# Patient Record
Sex: MALE | Race: WHITE
[De-identification: names, ages, dates, MRNs, and addresses within clinical notes are randomized per-mention and may not be internally consistent; named-entity substitution may affect disease eponyms.]

---

## 2018-06-25 ENCOUNTER — HOSPITAL ENCOUNTER (INPATIENT)
Dept: HOSPITAL 72 - EMR | Age: 83
LOS: 7 days | Discharge: NURSING, REHAB OR LONG-TERM CARE | DRG: 871 | End: 2018-07-02
Payer: MEDICARE

## 2018-06-25 VITALS — SYSTOLIC BLOOD PRESSURE: 121 MMHG | DIASTOLIC BLOOD PRESSURE: 67 MMHG

## 2018-06-25 VITALS — DIASTOLIC BLOOD PRESSURE: 64 MMHG | SYSTOLIC BLOOD PRESSURE: 104 MMHG

## 2018-06-25 VITALS — WEIGHT: 173.01 LBS | HEIGHT: 72 IN | BODY MASS INDEX: 23.43 KG/M2

## 2018-06-25 VITALS — DIASTOLIC BLOOD PRESSURE: 62 MMHG | SYSTOLIC BLOOD PRESSURE: 127 MMHG

## 2018-06-25 VITALS — DIASTOLIC BLOOD PRESSURE: 67 MMHG | SYSTOLIC BLOOD PRESSURE: 127 MMHG

## 2018-06-25 VITALS — SYSTOLIC BLOOD PRESSURE: 116 MMHG | DIASTOLIC BLOOD PRESSURE: 75 MMHG

## 2018-06-25 VITALS — SYSTOLIC BLOOD PRESSURE: 113 MMHG | DIASTOLIC BLOOD PRESSURE: 66 MMHG

## 2018-06-25 VITALS — SYSTOLIC BLOOD PRESSURE: 111 MMHG | DIASTOLIC BLOOD PRESSURE: 60 MMHG

## 2018-06-25 VITALS — DIASTOLIC BLOOD PRESSURE: 59 MMHG | SYSTOLIC BLOOD PRESSURE: 105 MMHG

## 2018-06-25 DIAGNOSIS — N18.9: ICD-10-CM

## 2018-06-25 DIAGNOSIS — N40.1: ICD-10-CM

## 2018-06-25 DIAGNOSIS — E44.1: ICD-10-CM

## 2018-06-25 DIAGNOSIS — I25.10: ICD-10-CM

## 2018-06-25 DIAGNOSIS — A41.9: Primary | ICD-10-CM

## 2018-06-25 DIAGNOSIS — Z66: ICD-10-CM

## 2018-06-25 DIAGNOSIS — Z95.810: ICD-10-CM

## 2018-06-25 DIAGNOSIS — N17.9: ICD-10-CM

## 2018-06-25 DIAGNOSIS — I13.0: ICD-10-CM

## 2018-06-25 DIAGNOSIS — E87.6: ICD-10-CM

## 2018-06-25 DIAGNOSIS — R13.10: ICD-10-CM

## 2018-06-25 DIAGNOSIS — E87.0: ICD-10-CM

## 2018-06-25 DIAGNOSIS — I42.9: ICD-10-CM

## 2018-06-25 DIAGNOSIS — E11.22: ICD-10-CM

## 2018-06-25 DIAGNOSIS — I25.9: ICD-10-CM

## 2018-06-25 DIAGNOSIS — J44.1: ICD-10-CM

## 2018-06-25 DIAGNOSIS — E86.0: ICD-10-CM

## 2018-06-25 DIAGNOSIS — N31.9: ICD-10-CM

## 2018-06-25 DIAGNOSIS — N39.0: ICD-10-CM

## 2018-06-25 DIAGNOSIS — J96.00: ICD-10-CM

## 2018-06-25 DIAGNOSIS — I48.0: ICD-10-CM

## 2018-06-25 DIAGNOSIS — I50.43: ICD-10-CM

## 2018-06-25 DIAGNOSIS — E87.3: ICD-10-CM

## 2018-06-25 DIAGNOSIS — R33.8: ICD-10-CM

## 2018-06-25 LAB
ADD MANUAL DIFF: NO
ALBUMIN SERPL-MCNC: 3 G/DL (ref 3.4–5)
ALBUMIN/GLOB SERPL: 0.7 {RATIO} (ref 1–2.7)
ALP SERPL-CCNC: 84 U/L (ref 46–116)
ALT SERPL-CCNC: < 6 U/L (ref 12–78)
ANION GAP SERPL CALC-SCNC: 4 MMOL/L (ref 5–15)
APPEARANCE UR: (no result)
APTT BLD: 28 SEC (ref 23–33)
APTT PPP: (no result) S
AST SERPL-CCNC: 13 U/L (ref 15–37)
BASOPHILS NFR BLD AUTO: 0.4 % (ref 0–2)
BILIRUB SERPL-MCNC: 0.4 MG/DL (ref 0.2–1)
BUN SERPL-MCNC: 51 MG/DL (ref 7–18)
CALCIUM SERPL-MCNC: 9.3 MG/DL (ref 8.5–10.1)
CHLORIDE SERPL-SCNC: 107 MMOL/L (ref 98–107)
CK MB SERPL-MCNC: 0.8 NG/ML (ref 0–3.6)
CK SERPL-CCNC: 36 U/L (ref 26–308)
CO2 SERPL-SCNC: 35 MMOL/L (ref 21–32)
CREAT SERPL-MCNC: 2.2 MG/DL (ref 0.55–1.3)
EOSINOPHIL NFR BLD AUTO: 1.4 % (ref 0–3)
ERYTHROCYTE [DISTWIDTH] IN BLOOD BY AUTOMATED COUNT: 14.9 % (ref 11.6–14.8)
GLOBULIN SER-MCNC: 4.6 G/DL
GLUCOSE UR STRIP-MCNC: NEGATIVE MG/DL
HCT VFR BLD CALC: 31.4 % (ref 42–52)
HGB BLD-MCNC: 9.9 G/DL (ref 14.2–18)
INR PPP: 1.1 (ref 0.9–1.1)
KETONES UR QL STRIP: NEGATIVE
LEUKOCYTE ESTERASE UR QL STRIP: (no result)
LYMPHOCYTES NFR BLD AUTO: 29.8 % (ref 20–45)
MCV RBC AUTO: 102 FL (ref 80–99)
MONOCYTES NFR BLD AUTO: 9.3 % (ref 1–10)
NEUTROPHILS NFR BLD AUTO: 59.1 % (ref 45–75)
NITRITE UR QL STRIP: NEGATIVE
PH UR STRIP: 5 [PH] (ref 4.5–8)
PLATELET # BLD: 203 K/UL (ref 150–450)
POTASSIUM SERPL-SCNC: 4.2 MMOL/L (ref 3.5–5.1)
PROT UR QL STRIP: (no result)
RBC # BLD AUTO: 3.08 M/UL (ref 4.7–6.1)
SODIUM SERPL-SCNC: 146 MMOL/L (ref 136–145)
SP GR UR STRIP: 1.01 (ref 1–1.03)
UROBILINOGEN UR-MCNC: NORMAL MG/DL (ref 0–1)
WBC # BLD AUTO: 5.1 K/UL (ref 4.8–10.8)

## 2018-06-25 PROCEDURE — 80053 COMPREHEN METABOLIC PANEL: CPT

## 2018-06-25 PROCEDURE — 83605 ASSAY OF LACTIC ACID: CPT

## 2018-06-25 PROCEDURE — 85730 THROMBOPLASTIN TIME PARTIAL: CPT

## 2018-06-25 PROCEDURE — 80162 ASSAY OF DIGOXIN TOTAL: CPT

## 2018-06-25 PROCEDURE — 81003 URINALYSIS AUTO W/O SCOPE: CPT

## 2018-06-25 PROCEDURE — 83735 ASSAY OF MAGNESIUM: CPT

## 2018-06-25 PROCEDURE — 84484 ASSAY OF TROPONIN QUANT: CPT

## 2018-06-25 PROCEDURE — 87040 BLOOD CULTURE FOR BACTERIA: CPT

## 2018-06-25 PROCEDURE — 93005 ELECTROCARDIOGRAM TRACING: CPT

## 2018-06-25 PROCEDURE — 94660 CPAP INITIATION&MGMT: CPT

## 2018-06-25 PROCEDURE — 80061 LIPID PANEL: CPT

## 2018-06-25 PROCEDURE — 87181 SC STD AGAR DILUTION PER AGT: CPT

## 2018-06-25 PROCEDURE — 94664 DEMO&/EVAL PT USE INHALER: CPT

## 2018-06-25 PROCEDURE — 94760 N-INVAS EAR/PLS OXIMETRY 1: CPT

## 2018-06-25 PROCEDURE — 82553 CREATINE MB FRACTION: CPT

## 2018-06-25 PROCEDURE — 85025 COMPLETE CBC W/AUTO DIFF WBC: CPT

## 2018-06-25 PROCEDURE — 85610 PROTHROMBIN TIME: CPT

## 2018-06-25 PROCEDURE — 94640 AIRWAY INHALATION TREATMENT: CPT

## 2018-06-25 PROCEDURE — 93306 TTE W/DOPPLER COMPLETE: CPT

## 2018-06-25 PROCEDURE — 71045 X-RAY EXAM CHEST 1 VIEW: CPT

## 2018-06-25 PROCEDURE — 83880 ASSAY OF NATRIURETIC PEPTIDE: CPT

## 2018-06-25 PROCEDURE — 82803 BLOOD GASES ANY COMBINATION: CPT

## 2018-06-25 PROCEDURE — 82962 GLUCOSE BLOOD TEST: CPT

## 2018-06-25 PROCEDURE — 36600 WITHDRAWAL OF ARTERIAL BLOOD: CPT

## 2018-06-25 PROCEDURE — 36415 COLL VENOUS BLD VENIPUNCTURE: CPT

## 2018-06-25 PROCEDURE — 87086 URINE CULTURE/COLONY COUNT: CPT

## 2018-06-25 PROCEDURE — 99291 CRITICAL CARE FIRST HOUR: CPT

## 2018-06-25 PROCEDURE — 82550 ASSAY OF CK (CPK): CPT

## 2018-06-25 PROCEDURE — 87081 CULTURE SCREEN ONLY: CPT

## 2018-06-25 RX ADMIN — IPRATROPIUM BROMIDE AND ALBUTEROL SULFATE SCH ML: .5; 3 SOLUTION RESPIRATORY (INHALATION) at 11:19

## 2018-06-25 RX ADMIN — IPRATROPIUM BROMIDE AND ALBUTEROL SULFATE SCH ML: .5; 3 SOLUTION RESPIRATORY (INHALATION) at 19:23

## 2018-06-25 RX ADMIN — DIGOXIN SCH MG: 0.12 TABLET ORAL at 09:00

## 2018-06-25 RX ADMIN — VITAMIN D, TAB 1000IU (100/BT) SCH INTLU: 25 TAB at 09:00

## 2018-06-25 RX ADMIN — DEXTROSE MONOHYDRATE SCH MLS/HR: 50 INJECTION, SOLUTION INTRAVENOUS at 14:02

## 2018-06-25 RX ADMIN — INSULIN ASPART SCH UNITS: 100 INJECTION, SOLUTION INTRAVENOUS; SUBCUTANEOUS at 12:46

## 2018-06-25 RX ADMIN — DEXTROSE MONOHYDRATE SCH MLS/HR: 50 INJECTION, SOLUTION INTRAVENOUS at 22:09

## 2018-06-25 RX ADMIN — DORZOLAMIDE HYDROCHLORIDE SCH DROP: 20 SOLUTION/ DROPS OPHTHALMIC at 13:00

## 2018-06-25 RX ADMIN — DOCUSATE SODIUM SCH MG: 100 CAPSULE, LIQUID FILLED ORAL at 09:00

## 2018-06-25 RX ADMIN — Medication SCH MG: at 09:00

## 2018-06-25 RX ADMIN — CARBIDOPA AND LEVODOPA SCH TAB: 25; 100 TABLET ORAL at 17:00

## 2018-06-25 RX ADMIN — LATANOPROST SCH DROP: 50 SOLUTION OPHTHALMIC at 20:49

## 2018-06-25 RX ADMIN — CARBIDOPA AND LEVODOPA SCH TAB: 25; 100 TABLET ORAL at 09:00

## 2018-06-25 RX ADMIN — IPRATROPIUM BROMIDE AND ALBUTEROL SULFATE SCH ML: .5; 3 SOLUTION RESPIRATORY (INHALATION) at 14:58

## 2018-06-25 RX ADMIN — INSULIN DETEMIR SCH UNITS: 100 INJECTION, SOLUTION SUBCUTANEOUS at 09:19

## 2018-06-25 RX ADMIN — INSULIN DETEMIR SCH UNITS: 100 INJECTION, SOLUTION SUBCUTANEOUS at 18:00

## 2018-06-25 RX ADMIN — INSULIN ASPART SCH UNITS: 100 INJECTION, SOLUTION INTRAVENOUS; SUBCUTANEOUS at 21:00

## 2018-06-25 RX ADMIN — IPRATROPIUM BROMIDE AND ALBUTEROL SULFATE SCH ML: .5; 3 SOLUTION RESPIRATORY (INHALATION) at 22:52

## 2018-06-25 RX ADMIN — DORZOLAMIDE HYDROCHLORIDE SCH DROP: 20 SOLUTION/ DROPS OPHTHALMIC at 18:43

## 2018-06-25 RX ADMIN — TAMSULOSIN HYDROCHLORIDE SCH MG: 0.4 CAPSULE ORAL at 21:00

## 2018-06-25 RX ADMIN — Medication SCH TAB: at 21:00

## 2018-06-25 RX ADMIN — CARBIDOPA AND LEVODOPA SCH TAB: 25; 100 TABLET ORAL at 13:00

## 2018-06-25 RX ADMIN — DOCUSATE SODIUM SCH MG: 100 CAPSULE, LIQUID FILLED ORAL at 17:18

## 2018-06-25 RX ADMIN — CARBIDOPA AND LEVODOPA SCH TAB: 25; 100 TABLET ORAL at 21:00

## 2018-06-25 RX ADMIN — MEMANTINE HYDROCHLORIDE SCH MG: 10 TABLET ORAL at 09:00

## 2018-06-25 RX ADMIN — MEMANTINE HYDROCHLORIDE SCH MG: 10 TABLET ORAL at 17:18

## 2018-06-25 RX ADMIN — MAGNESIUM HYDROXIDE SCH ML: 400 SUSPENSION ORAL at 09:00

## 2018-06-25 RX ADMIN — INSULIN ASPART SCH UNITS: 100 INJECTION, SOLUTION INTRAVENOUS; SUBCUTANEOUS at 18:46

## 2018-06-25 NOTE — CONSULTATION
DATE OF CONSULTATION:  06/25/2018



INFECTIOUS DISEASES CONSULTATION



CONSULTING PHYSICIAN:  Shima Sanon M.D.



REFERRING PHYSICIAN:  Francisco Gomes M.D.



REASON FOR CONSULTATION:  Urinary tract infection.



HISTORY OF PRESENTING ILLNESS:  This is an 87-year-old gentleman with

history of diabetes, hypertension, chronic obstructive pulmonary disease,

congestive heart failure, and atrial fibrillation, who comes in from a

skilled nursing facility as he was unable to be aroused and was poorly

responsive.  He had a chest x-ray, which showed congestive heart failure.

He was also found to have urinary tract infection and an Infectious

Diseases consultation has been obtained for antibiotics.



PAST MEDICAL HISTORY:

1. History of diabetes.

2. Hypertension.

3. Chronic obstructive pulmonary disease.

4. Congestive heart failure.

5. Atrial fibrillation.



MEDICATIONS:  As an inpatient, he is on Xalatan drops, Senokot, vitamin B

complex, Coumadin, Zosyn, insulin, ipratropium and albuterol, Trusopt,

enoxaparin, calcium carbonate, carbidopa, levodopa, clonidine, digoxin,

docusate, finasteride, folic acid, milk of magnesia, Namenda,

multivitamin, ascorbic acid, vitamin D, ferrous sulfate, insulin,

Protonix, Lasix, warfarin, Tylenol, and Dulcolax.



ALLERGIES:

1. Apixaban.

2. Bumetanide.

3. Spironolactone.



SOCIAL HISTORY:  No history of smoking, alcohol, or drug use.



FAMILY HISTORY:  Unknown.



REVIEW OF SYSTEMS:  Unable to obtain currently.



PHYSICAL EXAMINATION:

VITAL SIGNS:  Temperature of 98.1, T-max of 102, pulse of 78, respiratory

rate of 22, blood pressure of 104/64, and O2 saturation of 96%.

HEENT:  Pupils equally reactive to light and accommodation.  Mouth appears

clean without thrush.

NECK:  Supple.  No adenopathy.  No JVD.

CARDIOVASCULAR:  Regular rate and rhythm.  No murmurs.

LUNGS:  Clear to auscultation bilaterally.  No crackles.  No

wheezes.

ABDOMEN:  Soft and nontender.  No organomegaly.

EXTREMITIES:  No cyanosis, no clubbing, no edema.



LABORATORY AND DIAGNOSTIC DATA:  White count 5.1, hemoglobin 9.9,

hematocrit 31.4, , and platelet count of 203,000 with neutrophils

of 59%.  Sodium 146, potassium 4.2, chloride 109, bicarb 35, BUN 51,

creatinine 2.2, glucose 228, and calcium 9.3.  Total bilirubin 0.4.  AST

13, ALT less than 6, and alkaline phosphatase 84.  CK of 36 and CK-MB 0.8.

Troponin 0.074.  Beta-natriuretic peptide 11,646.  Total protein 7.6.

Albumin of 3.  UA is showing 40 to 60 white cells.  Urine cultures are

pending.  Chest x-ray is showing left base and perihilar atelectasis.



ASSESSMENT:  This is an 87-year-old gentleman with history of diabetes,

hypertension, and chronic obstructive pulmonary disease, who comes in

with,



1. Urinary tract infection.

2. Renal failure.

3. Congestive heart failure.



PLAN:

1. Continue Zosyn for now.

2. We will follow up cultures and adjust antibiotics accordingly.



I would like to thank, Dr. Gomes, for this consultation.









  ______________________________________________

  Shima Sanon M.D. DR:  MYESHA

D:  06/25/2018 14:50

T:  06/25/2018 21:18

JOB#:  [id number]

CC:  Francisco Gomes M.D.

## 2018-06-25 NOTE — HISTORY AND PHYSICAL REPORT
DATE OF ADMISSION:  06/25/2018



CHIEF COMPLAINT:  Shortness of breath, sepsis, urinary tract infection.



HISTORY OF PRESENT ILLNESS:  The patient is an unfortunate 87-year-old

male.  He has history of COPD, congestive heart failure, paroxysmal AFib,

diabetes, and hypertension.  He has prior history of aspiration pneumonia

and recent history of urinary tract infection.  He was transferred from a

skilled nursing facility after staff noted that the patient was poorly

responsive.  He was unable to be aroused.  Vital signs were stable.  On

evaluation in the emergency room, the patient had evidence of urinary

tract infection.  He was also short of breath.  He also had evidence of

congestive heart failure on x-ray.  He was started on Lasix, BiPAP.  He

was given intravenous antibiotics for infection and is now admitted for

further evaluation and care.



PAST MEDICAL HISTORY:  As above.



PAST SURGICAL HISTORY:  None.



CURRENT MEDICATIONS:  Reconciled and reviewed.



ALLERGIES:  Include apixaban, Bumex, Aldactone.



FAMILY HISTORY:  Noncontributory.



SOCIAL HISTORY:  There is no known history of tobacco, ethanol, or drugs.



REVIEW OF SYSTEMS:  From the patient is unobtainable as he is currently

weak.



PHYSICAL EXAMINATION:

VITAL SIGNS:  Temperature 100 degrees, pulse 75, respirations 20, blood

pressure 116/75.

GENERAL:  The patient is a well-developed male, in no apparent distress.

HEART:  Regular rate and rhythm.

LUNGS:  Clear.

ABDOMEN:  Soft, nontender, nondistended.

EXTREMITIES:  Without clubbing, cyanosis, or edema.



LABORATORY DATA:  White count was 5, hemoglobin 10, hematocrit 31, and

platelets of 203,000.  Coags are normal.  Sodium 146, potassium 4.2,

chloride 107, bicarbonate 35, BUN 51, creatinine 2.2.  Troponin 0.074.

Urine showed 40 to 60 wbc's.



ASSESSMENT:  This is a pleasant, but unfortunate male with complaints of

sepsis secondary to UTI and CHF exacerbation.



PROBLEM LIST:

1. Sepsis.

2. UTI.

3. Respiratory failure.

4. CHF exacerbation.

5. AFib.

6. Hypertension.

7. Diabetes.

8. History of chronic kidney disease.



PLAN:  We will continue BiPAP.  Check an ABG.  Supplemental oxygen as

needed.  We will wean as able.  Pulmonary consultation will be obtained.

Continue _____ broad-spectrum antibiotics.  Follow up cultures.  ID

consultation.  Continue antiplatelet therapy and Coumadin.  Cardiology

consultation will also be obtained.  The patient has advanced directive

for DNR.  We will monitor the patient's renal function and volume status

closely while on diuretic therapy.









  ______________________________________________

  Francisco Gomes M.D.





DR:  Kennedy

D:  06/25/2018 08:20

T:  06/25/2018 10:49

JOB#:  [id number]

CC:

## 2018-06-25 NOTE — CONSULTATION
DATE OF CONSULTATION:  06/25/2018



INFECTIOUS DISEASES CONSULTATION



CONSULTING PHYSICIAN:  Shima Sanon M.D.



REFERRING PHYSICIAN:  Francisco Gomes M.D.



REASON FOR CONSULTATION:  Urinary tract infection.



HISTORY OF PRESENTING ILLNESS:  This is an 87-year-old gentleman with

history of diabetes, hypertension, chronic obstructive pulmonary disease,

congestive heart failure, and atrial fibrillation, who comes in from a

skilled nursing facility as he was unable to be aroused and was poorly

responsive.  He had a chest x-ray, which showed congestive heart failure.

He was also found to have urinary tract infection and an Infectious

Diseases consultation has been obtained for antibiotics.



PAST MEDICAL HISTORY:

1. History of diabetes.

2. Hypertension.

3. Chronic obstructive pulmonary disease.

4. Congestive heart failure.

5. Atrial fibrillation.



MEDICATIONS:  As an inpatient, he is on Xalatan drops, Senokot, vitamin B

complex, Coumadin, Zosyn, insulin, ipratropium and albuterol, Trusopt,

enoxaparin, calcium carbonate, carbidopa, levodopa, clonidine, digoxin,

docusate, finasteride, folic acid, milk of magnesia, Namenda,

multivitamin, ascorbic acid, vitamin D, ferrous sulfate, insulin,

Protonix, Lasix, warfarin, Tylenol, and Dulcolax.



ALLERGIES:

1. Apixaban.

2. Bumetanide.

3. Spironolactone.



SOCIAL HISTORY:  No history of smoking, alcohol, or drug use.



FAMILY HISTORY:  Unknown.



REVIEW OF SYSTEMS:  Unable to obtain currently.



PHYSICAL EXAMINATION:

VITAL SIGNS:  Temperature of 98.1, T-max of 102, pulse of 78, respiratory

rate of 22, blood pressure of 104/64, and O2 saturation of 96%.

HEENT:  Pupils equally reactive to light and accommodation.  Mouth appears

clean without thrush.

NECK:  Supple.  No adenopathy.  No JVD.

CARDIOVASCULAR:  Regular rate and rhythm.  No murmurs.

LUNGS:  Clear to auscultation bilaterally.  No crackles.  No

wheezes.

ABDOMEN:  Soft and nontender.  No organomegaly.

EXTREMITIES:  No cyanosis, no clubbing, no edema.



LABORATORY AND DIAGNOSTIC DATA:  White count 5.1, hemoglobin 9.9,

hematocrit 31.4, , and platelet count of 203,000 with neutrophils

of 59%.  Sodium 146, potassium 4.2, chloride 109, bicarb 35, BUN 51,

creatinine 2.2, glucose 228, and calcium 9.3.  Total bilirubin 0.4.  AST

13, ALT less than 6, and alkaline phosphatase 84.  CK of 36 and CK-MB 0.8.

Troponin 0.074.  Beta-natriuretic peptide 11,646.  Total protein 7.6.

Albumin of 3.  UA is showing 40 to 60 white cells.  Urine cultures are

pending.  Chest x-ray is showing left base and perihilar atelectasis.



ASSESSMENT:  This is an 87-year-old gentleman with history of diabetes,

hypertension, and chronic obstructive pulmonary disease, who comes in

with,



1. Urinary tract infection.

2. Renal failure.

3. Congestive heart failure.



PLAN:

1. Continue Zosyn for now.

2. We will follow up cultures and adjust antibiotics accordingly.



I would like to thank, Dr. Gomes, for this consultation.









  ______________________________________________

  Shima Sanon M.D. DR:  MYESHA

D:  06/25/2018 14:50

T:  06/25/2018 16:06

JOB#:  [id number]

CC:

## 2018-06-25 NOTE — CONSULTATION
DATE OF CONSULTATION:  06/25/2018



CONSULTING PHYSICIAN:  Alonzo Lares M.D.



REFERRING PHYSICIAN:  Francisco Gomes M.D.



REASON FOR CONSULTATION:  For evaluation of UTI.



HISTORY OF PRESENT ILLNESS:  This is an 87-year-old male, who is known to

me from recent evaluation at [facility].  The patient has a history of BPH.

He has a history of hematuria.  He was admitted to the hospital because of

altered mental status.  He was poorly arousable.  He was evaluated in the

emergency room, was noted to have pyuria and possible UTI.  Urology

evaluation is requested.  Braxton catheter was placed at the time of

admission and is currently indwelling.



PAST MEDICAL HISTORY:  Significant for above.  Also, history of COPD, CHF,

atrial fibrillation, diabetes, hypertension, and aspiration pneumonia.



PAST SURGICAL HISTORY:  Unknown.



CURRENT MEDICATIONS:  Here in the hospital, the patient is on _____,

Senokot, vitamin D, Zosyn, NovoLog, albuterol, Lovenox, Trusopt, calcium

carbonate, Sinemet, Catapres, digoxin, Colace, finasteride, folate, MOM,

Namenda, multivitamin, vitamin D, Feosol, Protonix, Lasix, and Coumadin.



ALLERGIES:  To apixaban, _____, spironolactone.



SOCIAL HISTORY:  Resident of nursing home.



FAMILY HISTORY:  Unable to obtain.



REVIEW OF SYSTEMS:  Difficult to obtain.



PHYSICAL EXAMINATION:

GENERAL:  Elderly male.

VITAL SIGNS:  Temperature is 98.7 degrees, blood pressure 111/60, pulse 75,

and respirations are 20.

HEENT:  Normocephalic.

NECK:  Supple.

ABDOMEN:  Soft.  Braxton is in place.  Urine is still grossly yellow.



LABORATORY AND DIAGNOSTIC DATA:  His BUN is 51, creatinine 2.2, and

potassium is 4.2.  White count 5.1, hemoglobin 9.9, and platelets are 203.

Urinalysis shows 0 to 2 rbc's, 40 to 60 wbc's and 2+ protein.



Diagnostic imaging studies; he had a chest x-ray, which showed left

basilar and perihilar atelectasis.



IMPRESSION:

1. Pyuria and probable urinary tract infection.

2. Proteinuria.

3. Benign prostatic hypertrophy history.

4. Urinary retention.

5. Neurogenic bladder.

6. Chronic renal insufficiency.



PLAN AND DISCUSSION:  The patient needs to continue with antibiotics as

ordered.  We will follow up on the results of urine culture and adjust

accordingly.  The patient has a Braxton catheter indwelling.  He is on

anticoagulation, this will be monitored and irrigated p.r.n. if he

develops gross hematuria.  He is to continue with finasteride as ordered.

I will also add Flomax 0.4 mg nightly in anticipation of a voiding trial

in the future.  The patient will need to have cystoscopy at some point and

we will consider upper tract imaging studies.



Thank you, Dr. Gomes, for asking me to participate in this

consultation.









  ______________________________________________

  Alonzo Lares M.D.





DR:  NATALIA

D:  06/25/2018 19:46

T:  06/25/2018 20:18

JOB#:  [id number]

CC:

## 2018-06-25 NOTE — DIAGNOSTIC IMAGING REPORT
Indication: Shortness of breath shortness of breath

 

Technique: One view of the chest

 

Comparison: none

 

Findings: There is thoracic scoliotic deformity. There is some atelectasis in the

left perihilar region and left lung base. Lungs and pleural spaces are otherwise

grossly clear. There is a left chest biventricular AICD. The heart is upper limits of

normal in size

 

Impression: Left basilar and perihilar atelectasis. No definite acute process

otherwise

 

Other findings as noted

## 2018-06-25 NOTE — EMERGENCY ROOM REPORT
History of Present Illness


General


Chief Complaint:  Dyspnea/Respdistress


Source:  Family Member, Medical Record, EMS





Present Illness


HPI


Is an 87-year-old male who has history of dementia and coronary disease, A. 

fib.  He presents with chief complaint respiratory distress.  Onset was acute 

and occurred just prior to arrival.  At baseline he is confused but per EMS, 

mental status as worse.  No fever or chills noted.  No nausea no vomiting.  EMS 

put him on oxygen and brought him here.  Unable to get any history from this 

patient because of his condition and mental status.


Allergies:  


Coded Allergies:  


     APIXABAN (Unverified  Allergy, Unknown, 6/25/18)


     BUMETANIDE (Unverified  Allergy, Unknown, 6/25/18)


     SPIRONOLACTONE (Unverified  Allergy, Unknown, 6/25/18)





Patient History


Past Medical History:  see triage record, old chart reviewed, HTN, CAD, CHF


Past Surgical History:  other


Pertinent Family History:  none


Social History:  Denies: smoking


Immunizations:  other


Reviewed Nursing Documentation:  PMH: Agreed; PSxH: Agreed





Review of Systems


Respiratory:  Reports: shortness of breath


All Other Systems:  limited - secondary to medical condition





Physical Exam





Vital Signs








  Date Time  Temp Pulse Resp B/P (MAP) Pulse Ox O2 Delivery O2 Flow Rate FiO2


 


6/25/18 00:59  80  127/67    





vitals with hypoxia


Sp02 EP Interpretation:  abnormal


General Appearance:  moderate distress, thin, Chronically Ill


Head:  normocephalic, atraumatic


Eyes:  bilateral eye PERRL, bilateral eye EOMI


ENT:  dry mucus membranes


Neck:  full range of motion, supple, no meningismus


Respiratory:  chest non-tender, accessory muscle use, rales, rhonchi


Cardiovascular #1:  regular rate, rhythm, no murmur


Gastrointestinal:  normal bowel sounds, non tender, no mass, no organomegaly, 

no bruit, non-distended


Musculoskeletal:  back normal, normal range of motion


Neurologic:  other - moan to painful stimuli


Skin:  warm/dry





Procedures


Critical Care Time


Critical Care Time


Critical care is mandated in this patient who presented with sepsis and CHF.  

Patient require my urgent intervention to attenuate the risks of metabolic 

collapse which may lead to cardiovascular collapse and death.  Critical care 

time is 35 minutes excluding any reportable procedure.  Critical care time 

included evaluation, multiple reevaluation, looking at old charts, interpreting 

laboratory and diagnostic data, discussing case with patient and family and 

consultants, and charting.





Medical Decision Making


Diagnostic Impression:  


 Primary Impression:  


 CHF (congestive heart failure)


 Qualified Codes:  I50.9 - Heart failure, unspecified


 Additional Impressions:  


 Sepsis


 Qualified Codes:  A41.9 - Sepsis, unspecified organism


 UTI (urinary tract infection)


 Qualified Codes:  N30.00 - Acute cystitis without hematuria


 CKD (chronic kidney disease)


 Qualified Codes:  N18.9 - Chronic kidney disease, unspecified


 Anemia


 Qualified Codes:  D64.9 - Anemia, unspecified


 Proteinuria


 Qualified Codes:  R80.9 - Proteinuria, unspecified


 Toxic metabolic encephalopathy


ER Course


Patient presents with respiratory distress and has a fever 102.  Urine show 

evidence of infection.  His respiratory distress much improved with breathing 

treatment and BiPAP.  Antibiotic started.  No obvious pneumonia.  No evidence 

of PE, dissection, temp and not to name a few.  We'll admit to stepdown unit.  

I contacted Dr. Gomes for admission.


Lab Results Impression


labs showed elevated BNP


EKG Diagnostic Results


Rate:  normal


Rhythm:  other - paced rhythm





Rhythm Strip Diag. Results


Rhythm Strip Time:  01:18


EP Interpretation:  yes


Rate:  76


Rhythm:  NSR, no PVC's, no ectopy





Chest X-Ray Diagnostic Results


Chest X-Ray Diagnostic Results :  


   Chest X-Ray Ordered:  Yes


   # of Views/Limited/Complete:  1 View


   Indication:  Shortness of Breath


   EP Interpretation:  Yes


   Interpretation:  no consolidation, no effusion, no pneumothorax, other - 

cardiomegaly with vascular congestion


   Impression:  Other - CHF


   Electronically Signed by:  Maximo Rdz MD





Last Vital Signs








  Date Time  Temp Pulse Resp B/P (MAP) Pulse Ox O2 Delivery O2 Flow Rate FiO2


 


6/25/18 00:59  80  127/67    








Status:  improved


Disposition:  ADMITTED AS INPATIENT


Condition:  Serious











MAXIMO RDZ M.D. Jun 25, 2018 01:18

## 2018-06-26 VITALS — DIASTOLIC BLOOD PRESSURE: 70 MMHG | SYSTOLIC BLOOD PRESSURE: 123 MMHG

## 2018-06-26 VITALS — DIASTOLIC BLOOD PRESSURE: 65 MMHG | SYSTOLIC BLOOD PRESSURE: 102 MMHG

## 2018-06-26 VITALS — SYSTOLIC BLOOD PRESSURE: 118 MMHG | DIASTOLIC BLOOD PRESSURE: 66 MMHG

## 2018-06-26 VITALS — SYSTOLIC BLOOD PRESSURE: 100 MMHG | DIASTOLIC BLOOD PRESSURE: 65 MMHG

## 2018-06-26 VITALS — DIASTOLIC BLOOD PRESSURE: 67 MMHG | SYSTOLIC BLOOD PRESSURE: 113 MMHG

## 2018-06-26 VITALS — SYSTOLIC BLOOD PRESSURE: 121 MMHG | DIASTOLIC BLOOD PRESSURE: 65 MMHG

## 2018-06-26 LAB
ADD MANUAL DIFF: NO
ALBUMIN SERPL-MCNC: 3 G/DL (ref 3.4–5)
ALBUMIN/GLOB SERPL: 0.7 {RATIO} (ref 1–2.7)
ALP SERPL-CCNC: 82 U/L (ref 46–116)
ALT SERPL-CCNC: 11 U/L (ref 12–78)
ANION GAP SERPL CALC-SCNC: 9 MMOL/L (ref 5–15)
AST SERPL-CCNC: 17 U/L (ref 15–37)
BASOPHILS NFR BLD AUTO: 0.5 % (ref 0–2)
BILIRUB SERPL-MCNC: 0.5 MG/DL (ref 0.2–1)
BUN SERPL-MCNC: 55 MG/DL (ref 7–18)
CALCIUM SERPL-MCNC: 9.7 MG/DL (ref 8.5–10.1)
CHLORIDE SERPL-SCNC: 107 MMOL/L (ref 98–107)
CHOLEST SERPL-MCNC: 207 MG/DL (ref ?–200)
CO2 SERPL-SCNC: 34 MMOL/L (ref 21–32)
CREAT SERPL-MCNC: 1.9 MG/DL (ref 0.55–1.3)
EOSINOPHIL NFR BLD AUTO: 1.4 % (ref 0–3)
ERYTHROCYTE [DISTWIDTH] IN BLOOD BY AUTOMATED COUNT: 14.9 % (ref 11.6–14.8)
GLOBULIN SER-MCNC: 4.6 G/DL
HCT VFR BLD CALC: 32.8 % (ref 42–52)
HDLC SERPL-MCNC: 45 MG/DL (ref 40–60)
HGB BLD-MCNC: 10.3 G/DL (ref 14.2–18)
INR PPP: 1.1 (ref 0.9–1.1)
LYMPHOCYTES NFR BLD AUTO: 23.1 % (ref 20–45)
MCV RBC AUTO: 103 FL (ref 80–99)
MONOCYTES NFR BLD AUTO: 11.4 % (ref 1–10)
NEUTROPHILS NFR BLD AUTO: 63.6 % (ref 45–75)
PLATELET # BLD: 171 K/UL (ref 150–450)
POTASSIUM SERPL-SCNC: 4 MMOL/L (ref 3.5–5.1)
RBC # BLD AUTO: 3.18 M/UL (ref 4.7–6.1)
SODIUM SERPL-SCNC: 150 MMOL/L (ref 136–145)
TRIGL SERPL-MCNC: 146 MG/DL (ref 30–150)
WBC # BLD AUTO: 5.3 K/UL (ref 4.8–10.8)

## 2018-06-26 RX ADMIN — INSULIN DETEMIR SCH UNITS: 100 INJECTION, SOLUTION SUBCUTANEOUS at 17:49

## 2018-06-26 RX ADMIN — INSULIN ASPART SCH UNITS: 100 INJECTION, SOLUTION INTRAVENOUS; SUBCUTANEOUS at 06:30

## 2018-06-26 RX ADMIN — INSULIN ASPART SCH UNITS: 100 INJECTION, SOLUTION INTRAVENOUS; SUBCUTANEOUS at 12:05

## 2018-06-26 RX ADMIN — CARBIDOPA AND LEVODOPA SCH TAB: 25; 100 TABLET ORAL at 05:00

## 2018-06-26 RX ADMIN — Medication SCH MG: at 09:00

## 2018-06-26 RX ADMIN — IPRATROPIUM BROMIDE AND ALBUTEROL SULFATE SCH ML: .5; 3 SOLUTION RESPIRATORY (INHALATION) at 18:48

## 2018-06-26 RX ADMIN — DORZOLAMIDE HYDROCHLORIDE SCH DROP: 20 SOLUTION/ DROPS OPHTHALMIC at 17:48

## 2018-06-26 RX ADMIN — DEXTROSE MONOHYDRATE SCH MLS/HR: 50 INJECTION, SOLUTION INTRAVENOUS at 13:40

## 2018-06-26 RX ADMIN — DIGOXIN SCH MG: 0.12 TABLET ORAL at 09:00

## 2018-06-26 RX ADMIN — IPRATROPIUM BROMIDE AND ALBUTEROL SULFATE SCH ML: .5; 3 SOLUTION RESPIRATORY (INHALATION) at 11:08

## 2018-06-26 RX ADMIN — TAMSULOSIN HYDROCHLORIDE SCH MG: 0.4 CAPSULE ORAL at 20:42

## 2018-06-26 RX ADMIN — IPRATROPIUM BROMIDE AND ALBUTEROL SULFATE SCH ML: .5; 3 SOLUTION RESPIRATORY (INHALATION) at 23:21

## 2018-06-26 RX ADMIN — MEMANTINE HYDROCHLORIDE SCH MG: 10 TABLET ORAL at 09:00

## 2018-06-26 RX ADMIN — MEMANTINE HYDROCHLORIDE SCH MG: 10 TABLET ORAL at 17:15

## 2018-06-26 RX ADMIN — DOCUSATE SODIUM SCH MG: 100 CAPSULE, LIQUID FILLED ORAL at 17:15

## 2018-06-26 RX ADMIN — CARBIDOPA AND LEVODOPA SCH TAB: 25; 100 TABLET ORAL at 09:00

## 2018-06-26 RX ADMIN — DORZOLAMIDE HYDROCHLORIDE SCH DROP: 20 SOLUTION/ DROPS OPHTHALMIC at 08:42

## 2018-06-26 RX ADMIN — CARBIDOPA AND LEVODOPA SCH TAB: 25; 100 TABLET ORAL at 20:42

## 2018-06-26 RX ADMIN — IPRATROPIUM BROMIDE AND ALBUTEROL SULFATE SCH ML: .5; 3 SOLUTION RESPIRATORY (INHALATION) at 15:44

## 2018-06-26 RX ADMIN — LATANOPROST SCH DROP: 50 SOLUTION OPHTHALMIC at 20:43

## 2018-06-26 RX ADMIN — Medication SCH TAB: at 20:42

## 2018-06-26 RX ADMIN — INSULIN ASPART SCH UNITS: 100 INJECTION, SOLUTION INTRAVENOUS; SUBCUTANEOUS at 17:50

## 2018-06-26 RX ADMIN — DEXTROSE MONOHYDRATE SCH MLS/HR: 50 INJECTION, SOLUTION INTRAVENOUS at 05:52

## 2018-06-26 RX ADMIN — INSULIN DETEMIR SCH UNITS: 100 INJECTION, SOLUTION SUBCUTANEOUS at 08:54

## 2018-06-26 RX ADMIN — IPRATROPIUM BROMIDE AND ALBUTEROL SULFATE SCH ML: .5; 3 SOLUTION RESPIRATORY (INHALATION) at 02:31

## 2018-06-26 RX ADMIN — CARBIDOPA AND LEVODOPA SCH TAB: 25; 100 TABLET ORAL at 12:04

## 2018-06-26 RX ADMIN — INSULIN ASPART SCH UNITS: 100 INJECTION, SOLUTION INTRAVENOUS; SUBCUTANEOUS at 20:51

## 2018-06-26 RX ADMIN — VITAMIN D, TAB 1000IU (100/BT) SCH INTLU: 25 TAB at 09:00

## 2018-06-26 RX ADMIN — ASPIRIN 81 MG SCH MG: 81 TABLET ORAL at 09:00

## 2018-06-26 RX ADMIN — CARBIDOPA AND LEVODOPA SCH TAB: 25; 100 TABLET ORAL at 17:00

## 2018-06-26 RX ADMIN — DEXTROSE MONOHYDRATE SCH MLS/HR: 50 INJECTION, SOLUTION INTRAVENOUS at 21:56

## 2018-06-26 RX ADMIN — DOCUSATE SODIUM SCH MG: 100 CAPSULE, LIQUID FILLED ORAL at 09:00

## 2018-06-26 RX ADMIN — MAGNESIUM HYDROXIDE SCH ML: 400 SUSPENSION ORAL at 09:00

## 2018-06-26 RX ADMIN — CARBIDOPA AND LEVODOPA SCH TAB: 25; 100 TABLET ORAL at 01:00

## 2018-06-26 RX ADMIN — Medication SCH TAB: at 20:43

## 2018-06-26 RX ADMIN — IPRATROPIUM BROMIDE AND ALBUTEROL SULFATE SCH ML: .5; 3 SOLUTION RESPIRATORY (INHALATION) at 07:03

## 2018-06-26 NOTE — INFECTIOUS DISEASES PROG NOTE
Assessment/Plan


Assessment/Plan


antibiotics : zosyn





A


1. UTI


2. renal failure improving


3. COPD


4. CHF


5. diabetes


6. hypertension





P


1. continue zosyn


2. will follow up cultures





Subjective


ROS Limited/Unobtainable:  Yes


Allergies:  


Coded Allergies:  


     APIXABAN (Unverified  Allergy, Unknown, 6/25/18)


     BUMETANIDE (Unverified  Allergy, Unknown, 6/25/18)


     SPIRONOLACTONE (Unverified  Allergy, Unknown, 6/25/18)





Objective


Vital Signs





Last 24 Hour Vital Signs








  Date Time  Temp Pulse Resp B/P (MAP) Pulse Ox O2 Delivery O2 Flow Rate FiO2


 


6/26/18 12:00 98.4 76 22 123/70 98 Nasal Cannula 2.0 





 98.4       


 


6/26/18 11:44  75      


 


6/26/18 11:12  83 16  98 Nasal Cannula 2.0 28


 


6/26/18 11:00  69 20  98 Nasal Cannula 3.0 32


 


6/26/18 08:00 98.6 76 20 121/65 96 Nasal Cannula 4.0 





 98.6       


 


6/26/18 07:45  75      


 


6/26/18 07:13      Nasal Cannula 3.0 32


 


6/26/18 07:12  99 16  100 Nasal Cannula 3.0 32


 


6/26/18 06:55  75 16  98 Nasal Cannula 3.0 32


 


6/26/18 06:55     98 Nasal Cannula 3.0 32


 


6/26/18 04:00 98.7 75 20 100/65 100 Nasal Cannula 3.0 





 98.7       


 


6/26/18 04:00  75      


 


6/26/18 02:49  75 16  100 Nasal Cannula 3.0 32


 


6/26/18 02:42  75 16  100 Nasal Cannula 3.0 32


 


6/26/18 00:00 98.3 75 20 113/67 100 Nasal Cannula 3.0 





 98.3       


 


6/26/18 00:00  75      


 


6/25/18 22:52  75 20  100 Nasal Cannula 3.0 32


 


6/25/18 22:45  75 18  100 Nasal Cannula 3.0 32


 


6/25/18 22:32  62  115/68    


 


6/25/18 20:00 98.9 75 20 121/67 100 Nasal Cannula 3.0 





 98.9       


 


6/25/18 20:00  75      


 


6/25/18 19:22  75 18  100 Nasal Cannula 3.0 32


 


6/25/18 19:11  78 20  100 Nasal Cannula 3.0 32


 


6/25/18 19:07      Nasal Cannula 3.0 32


 


6/25/18 19:07     100 Nasal Cannula 3.0 32


 


6/25/18 16:00 98.7 75 20 111/60 100 Nasal Cannula 3.0 





 98.7       


 


6/25/18 16:00  75      


 


6/25/18 16:00       3.0 


 


6/25/18 15:07     99 Nasal Cannula 3.0 32


 


6/25/18 15:06  75 20  99 Nasal Cannula 3.0 32


 


6/25/18 14:55  75 22  100 Venturi Mask 8.0 40


 


6/25/18 14:14  78 22   Venturi Mask 8.0 40








Height (Feet):  6


Height (Inches):  0.00


Weight (Pounds):  153


Respiratory/Chest:  lungs clear


Cardiovascular:  normal rate, regular rhythm, no gallop/murmur


Abdomen:  soft, non tender


Extremities:  no edema





Microbiology








 Date/Time


Source Procedure


Growth Status


 


 


 6/25/18 01:07


Blood Blood Culture - Preliminary


NO GROWTH AFTER 24 HOURS Resulted


 


 6/25/18 01:07


Blood Blood Culture - Preliminary


NO GROWTH AFTER 24 HOURS Resulted


 


 6/25/18 01:07


Urine,Clean Catch Urine Culture - Preliminary


NO GROWTH AFTER 24 HOURS Resulted











Laboratory Tests








Test


  6/26/18


04:20


 


White Blood Count


  5.3 K/UL


(4.8-10.8)


 


Red Blood Count


  3.18 M/UL


(4.70-6.10)  L


 


Hemoglobin


  10.3 G/DL


(14.2-18.0)  L


 


Hematocrit


  32.8 %


(42.0-52.0)  L


 


Mean Corpuscular Volume


  103 FL (80-99)


H


 


Mean Corpuscular Hemoglobin


  32.4 PG


(27.0-31.0)  H


 


Mean Corpuscular Hemoglobin


Concent 31.3 G/DL


(32.0-36.0)  L


 


Red Cell Distribution Width


  14.9 %


(11.6-14.8)  H


 


Platelet Count


  171 K/UL


(150-450)


 


Mean Platelet Volume


  6.6 FL


(6.5-10.1)


 


Neutrophils (%) (Auto)


  63.6 %


(45.0-75.0)


 


Lymphocytes (%) (Auto)


  23.1 %


(20.0-45.0)


 


Monocytes (%) (Auto)


  11.4 %


(1.0-10.0)  H


 


Eosinophils (%) (Auto)


  1.4 %


(0.0-3.0)


 


Basophils (%) (Auto)


  0.5 %


(0.0-2.0)


 


Prothrombin Time


  12.0 SEC


(9.30-11.50)  H


 


Prothromb Time International


Ratio 1.1 (0.9-1.1)  


 


 


Sodium Level


  150 MMOL/L


(136-145)  H


 


Potassium Level


  4.0 MMOL/L


(3.5-5.1)


 


Chloride Level


  107 MMOL/L


()


 


Carbon Dioxide Level


  34 MMOL/L


(21-32)  H


 


Anion Gap


  9 mmol/L


(5-15)


 


Blood Urea Nitrogen


  55 mg/dL


(7-18)  H


 


Creatinine


  1.9 MG/DL


(0.55-1.30)  H


 


Estimat Glomerular Filtration


Rate  mL/min (>60)  


 


 


Glucose Level


  205 MG/DL


()  H


 


Calcium Level


  9.7 MG/DL


(8.5-10.1)


 


Total Bilirubin


  0.5 MG/DL


(0.2-1.0)


 


Aspartate Amino Transf


(AST/SGOT) 17 U/L (15-37)


 


 


Alanine Aminotransferase


(ALT/SGPT) 11 U/L (12-78)


L


 


Alkaline Phosphatase


  82 U/L


()


 


Total Protein


  7.6 G/DL


(6.4-8.2)


 


Albumin


  3.0 G/DL


(3.4-5.0)  L


 


Globulin 4.6 g/dL  


 


Albumin/Globulin Ratio


  0.7 (1.0-2.7)


L


 


Triglycerides Level


  146 MG/DL


()


 


Cholesterol Level


  207 MG/DL (<


200)  H


 


LDL Cholesterol


  146 mg/dL


(<100)  H


 


HDL Cholesterol


  45 MG/DL


(40-60)


 


Cholesterol/HDL Ratio


  4.6 (3.3-4.4)


H











Current Medications








 Medications


  (Trade)  Dose


 Ordered  Sig/Mitra


 Route


 PRN Reason  Start Time


 Stop Time Status Last Admin


Dose Admin


 


 Acetaminophen


  (Tylenol)  650 mg  Q6H  PRN


 ORAL


 Mild Pain (Pain Scale 1-3)  6/25/18 08:15


 7/25/18 08:14   


 


 


 Albuterol/


 Ipratropium


  (Albuterol/


 Ipratropium)  3 ml  Q4HRT


 HHN


   6/25/18 11:00


 6/30/18 10:59  6/26/18 11:08


 


 


 Ascorbic Acid


  (Vitamin C)  500 mg  DAILY


 ORAL


   6/25/18 09:00


 7/25/18 08:59   


 


 


 Aspirin


  (ASA)  81 mg  DAILY


 ORAL


   6/26/18 09:00


 7/26/18 08:59   


 


 


 Bisacodyl


  (Dulcolax)  10 mg  DAILYPRN  PRN


 RECTAL


 Constipation  6/25/18 08:15


 7/25/18 08:14   


 


 


 Calcium Carbonate


  (Os-Osmin)  1,250 mg  BIDPC


 ORAL


   6/25/18 09:00


 7/25/18 08:59   


 


 


 Carbidopa/Levodopa


  (Sinemet 25/100)  1 tab  Q4HR


 ORAL


   6/25/18 09:00


 7/25/18 08:59  6/26/18 12:04


 


 


 Carvedilol


  (Coreg)  3.125 mg  EVERY 12  HOURS


 ORAL


   6/25/18 22:32


 7/25/18 22:31   


 


 


 Clonidine HCl


  (Catapres Tab)  0.1 mg  Q4H  PRN


 ORAL


 SBP greater than 160  6/25/18 09:00


 7/25/18 08:59   


 


 


 Dextrose  1,000 ml @ 


 75 mls/hr  [id number]


 IV


   6/26/18 09:00


 7/26/18 08:59  6/26/18 09:01


 


 


 Dextrose


  (Dextrose 50%)  25 ml  STAT  PRN


 IV


 Hypoglycemia  6/25/18 08:15


 7/25/18 08:14   


 


 


 Dextrose


  (Dextrose 50%)  50 ml  STAT  PRN


 IV


 Hypoglycemia  6/25/18 08:15


 7/25/18 08:14   


 


 


 Digoxin


  (Lanoxin)  0.125 mg  DAILY


 ORAL


   6/25/18 09:00


 7/25/18 08:59   


 


 


 Docusate Sodium


  (Colace)  100 mg  TWICE A  DAY


 ORAL


   6/25/18 09:00


 7/25/18 08:59   


 


 


 Dorzolamide HCl


  (Trusopt)  1 drop  TWICE A  DAY


 RIGHT EYE


   6/25/18 10:00


 7/25/18 09:59  6/26/18 08:42


 


 


 Ferrous Sulfate


  (Feosol)  325 mg  THREE TIMES A  DAY


 ORAL


   6/25/18 09:00


 7/25/18 08:59  6/26/18 12:04


 


 


 Finasteride


  (Proscar)  5 mg  DAILY


 ORAL


   6/25/18 09:00


 7/25/18 08:59   


 


 


 Folic Acid


  (Folate)  1 mg  DAILY


 ORAL


   6/25/18 09:00


 7/25/18 08:59   


 


 


 Insulin Aspart


  (NovoLOG)    BEFORE MEALS AND  HS


 SUBQ


   6/25/18 11:30


 7/25/18 11:29  6/26/18 12:05


 


 


 Insulin Detemir


  (Levemir)  8 units  BID


 SUBQ


   6/25/18 09:00


 7/25/18 08:59  6/26/18 08:54


 


 


 Latanoprost


  (Xalatan)  1 drop  BEDTIME


 BOTH EYES


   6/25/18 21:00


 7/25/18 20:59  6/25/18 20:49


 


 


 Magnesium


 Hydroxide


  (Mom)  30 ml  DAILY


 ORAL


   6/25/18 09:00


 7/25/18 08:59   


 


 


 Memantine


  (Namenda)  10 mg  TWICE A  DAY


 ORAL


   6/25/18 09:00


 7/25/18 08:59   


 


 


 Multivitamins


  (Multivitamins)  1 tab  DAILY


 ORAL


   6/25/18 09:00


 7/25/18 08:59   


 


 


 Pantoprazole


  (Protonix)  40 mg  DAILY


 ORAL


   6/25/18 09:00


 7/25/18 08:59   


 


 


 Piperacillin Sod/


 Tazobactam Sod


 3.375 gm/Dextrose  110 ml @ 


 27.5 mls/hr  EVERY 8  HOURS


 IVPB


   6/25/18 14:00


 6/30/18 13:59  6/26/18 13:40


 


 


 Sennosides


  (Senokot)  1 tab  BEDTIME


 ORAL


   6/25/18 21:00


 7/25/18 20:59   


 


 


 Tamsulosin HCl


  (Flomax)  0.4 mg  BEDTIME


 ORAL


   6/25/18 21:00


 7/25/18 20:59   


 


 


 Vitamin B Complex


  (Vitamin B


 Complex)  1 tab  BEDTIME


 ORAL


   6/25/18 21:00


 7/25/18 20:59   


 


 


 Vitamin D


  (Vitamin D)  1,000 intlu  DAILY


 ORAL


   6/25/18 09:00


 7/25/18 08:59   


 


 


 Warfarin Sodium


  (Coumadin per


 pharmacy)  1 ea  DAILY  PRN


 MISC


 Per rx protocol  6/25/18 08:30


 7/25/18 08:29   


 


 


 Warfarin Sodium


  (Coumadin)  3 mg  COUMADIN  ONCE


 ORAL


   6/26/18 17:00


 6/26/18 17:01   


 

















NANCI PATE Jun 26, 2018 13:53

## 2018-06-26 NOTE — UROLOGY PROGRESS NOTE
Assessment/Plan


Assessment/Plan


1. Pyuria and probable urinary tract infection.


2. Proteinuria.


3. Benign prostatic hypertrophy history.


4. Urinary retention.


5. Neurogenic bladder.


6. Chronic renal insufficiency.





leonard indwelling


abx as ordered


flomax and proscar


cysto later





Subjective


Allergies:  


Coded Allergies:  


     APIXABAN (Unverified  Allergy, Unknown, 6/25/18)


     BUMETANIDE (Unverified  Allergy, Unknown, 6/25/18)


     SPIRONOLACTONE (Unverified  Allergy, Unknown, 6/25/18)


Subjective


non-verbal





Objective





Last 24 Hour Vital Signs








  Date Time  Temp Pulse Resp B/P (MAP) Pulse Ox O2 Delivery O2 Flow Rate FiO2


 


6/26/18 07:13      Nasal Cannula 3.0 32


 


6/26/18 07:12  99 16  100 Nasal Cannula 3.0 32


 


6/26/18 06:55  75 16  98 Nasal Cannula 3.0 32


 


6/26/18 06:55     98 Nasal Cannula 3.0 32


 


6/26/18 04:00 98.7 75 20 100/65 100 Nasal Cannula 3.0 





 98.7       


 


6/26/18 04:00  75      


 


6/26/18 02:49  75 16  100 Nasal Cannula 3.0 32


 


6/26/18 02:42  75 16  100 Nasal Cannula 3.0 32


 


6/26/18 00:00 98.3 75 20 113/67 100 Nasal Cannula 3.0 





 98.3       


 


6/26/18 00:00  75      


 


6/25/18 22:52  75 20  100 Nasal Cannula 3.0 32


 


6/25/18 22:45  75 18  100 Nasal Cannula 3.0 32


 


6/25/18 22:32  62  115/68    


 


6/25/18 20:00 98.9 75 20 121/67 100 Nasal Cannula 3.0 





 98.9       


 


6/25/18 20:00  75      


 


6/25/18 19:22  75 18  100 Nasal Cannula 3.0 32


 


6/25/18 19:11  78 20  100 Nasal Cannula 3.0 32


 


6/25/18 19:07      Nasal Cannula 3.0 32


 


6/25/18 19:07     100 Nasal Cannula 3.0 32


 


6/25/18 16:00 98.7 75 20 111/60 100 Nasal Cannula 3.0 





 98.7       


 


6/25/18 16:00  75      


 


6/25/18 16:00       3.0 


 


6/25/18 15:07     99 Nasal Cannula 3.0 32


 


6/25/18 15:06  75 20  99 Nasal Cannula 3.0 32


 


6/25/18 14:55  75 22  100 Venturi Mask 8.0 40


 


6/25/18 14:14  78 22   Venturi Mask 8.0 40


 


6/25/18 12:37        40


 


6/25/18 12:10     96 Venturi Mask 8.0 40


 


6/25/18 12:10      Venturi Mask 8.0 40


 


6/25/18 12:00 98.6 75 20 105/59 100 Bi-pap  40





 98.6       


 


6/25/18 12:00  75      


 


6/25/18 11:39  75 19  98 Bi-pap  35


 


6/25/18 11:19  75 21  100 Bi-pap  35


 


6/25/18 11:19  75 21  100 Facial  35


 


6/25/18 10:30       15.0 35


 


6/25/18 10:11        35


 


6/25/18 10:00       15.0 50


 


6/25/18 09:34  75 16  100 Bi-pap  50


 


6/25/18 09:24  75 16  100 Bi-pap  50


 


6/25/18 09:00  75 17  100 Facial  50


 


6/25/18 08:00       15.0 50


 


6/25/18 08:00 98.1 75 22 104/64 100 Bi-pap  50





 98.1       


 


6/25/18 08:00  75      

















Intake and Output  


 


 6/25/18 6/26/18





 19:00 07:00


 


Intake Total 110.0 ml 137.5 ml


 


Output Total 1300 ml 600 ml


 


Balance -1190.0 ml -462.5 ml


 


  


 


IV Total 110.0 ml 137.5 ml


 


Output Urine Total 1300 ml 600 ml











Microbiology








 Date/Time


Source Procedure


Growth Status


 


 


 6/25/18 01:07


Blood Blood Culture - Preliminary


NO GROWTH AFTER 24 HOURS Resulted








Current Medications








 Medications


  (Trade)  Dose


 Ordered  Sig/Mitra


 Route


 PRN Reason  Start Time


 Stop Time Status Last Admin


Dose Admin


 


 Acetaminophen


  (Tylenol)  650 mg  Q6H  PRN


 ORAL


 Mild Pain (Pain Scale 1-3)  6/25/18 08:15


 7/25/18 08:14   


 


 


 Albuterol/


 Ipratropium


  (Albuterol/


 Ipratropium)  3 ml  Q4HRT


 HHN


   6/25/18 11:00


 6/30/18 10:59  6/26/18 07:03


 


 


 Ascorbic Acid


  (Vitamin C)  500 mg  DAILY


 ORAL


   6/25/18 09:00


 7/25/18 08:59   


 


 


 Aspirin


  (ASA)  81 mg  DAILY


 ORAL


   6/26/18 09:00


 7/26/18 08:59   


 


 


 Bisacodyl


  (Dulcolax)  10 mg  DAILYPRN  PRN


 RECTAL


 Constipation  6/25/18 08:15


 7/25/18 08:14   


 


 


 Calcium Carbonate


  (Os-Osmin)  1,250 mg  BIDPC


 ORAL


   6/25/18 09:00


 7/25/18 08:59   


 


 


 Carbidopa/Levodopa


  (Sinemet 25/100)  1 tab  Q4HR


 ORAL


   6/25/18 09:00


 7/25/18 08:59   


 


 


 Carvedilol


  (Coreg)  3.125 mg  EVERY 12  HOURS


 ORAL


   6/25/18 22:32


 7/25/18 22:31   


 


 


 Clonidine HCl


  (Catapres Tab)  0.1 mg  Q4H  PRN


 ORAL


 SBP greater than 160  6/25/18 09:00


 7/25/18 08:59   


 


 


 Dextrose


  (Dextrose 50%)  25 ml  STAT  PRN


 IV


 Hypoglycemia  6/25/18 08:15


 7/25/18 08:14   


 


 


 Dextrose


  (Dextrose 50%)  50 ml  STAT  PRN


 IV


 Hypoglycemia  6/25/18 08:15


 7/25/18 08:14   


 


 


 Digoxin


  (Lanoxin)  0.125 mg  DAILY


 ORAL


   6/25/18 09:00


 7/25/18 08:59   


 


 


 Docusate Sodium


  (Colace)  100 mg  TWICE A  DAY


 ORAL


   6/25/18 09:00


 7/25/18 08:59   


 


 


 Dorzolamide HCl


  (Trusopt)  1 drop  TWICE A  DAY


 RIGHT EYE


   6/25/18 10:00


 7/25/18 09:59  6/25/18 18:43


 


 


 Ferrous Sulfate


  (Feosol)  325 mg  THREE TIMES A  DAY


 ORAL


   6/25/18 09:00


 7/25/18 08:59   


 


 


 Finasteride


  (Proscar)  5 mg  DAILY


 ORAL


   6/25/18 09:00


 7/25/18 08:59   


 


 


 Folic Acid


  (Folate)  1 mg  DAILY


 ORAL


   6/25/18 09:00


 7/25/18 08:59   


 


 


 Furosemide


  (Lasix)  40 mg  EVERY 12  HOURS


 IV


   6/25/18 09:00


 7/25/18 08:59  6/25/18 20:50


 


 


 Insulin Aspart


  (NovoLOG)    BEFORE MEALS AND  HS


 SUBQ


   6/25/18 11:30


 7/25/18 11:29  6/25/18 18:46


 


 


 Insulin Detemir


  (Levemir)  8 units  BID


 SUBQ


   6/25/18 09:00


 7/25/18 08:59  6/25/18 09:19


 


 


 Latanoprost


  (Xalatan)  1 drop  BEDTIME


 BOTH EYES


   6/25/18 21:00


 7/25/18 20:59  6/25/18 20:49


 


 


 Magnesium


 Hydroxide


  (Mom)  30 ml  DAILY


 ORAL


   6/25/18 09:00


 7/25/18 08:59   


 


 


 Memantine


  (Namenda)  10 mg  TWICE A  DAY


 ORAL


   6/25/18 09:00


 7/25/18 08:59   


 


 


 Multivitamins


  (Multivitamins)  1 tab  DAILY


 ORAL


   6/25/18 09:00


 7/25/18 08:59   


 


 


 Pantoprazole


  (Protonix)  40 mg  DAILY


 ORAL


   6/25/18 09:00


 7/25/18 08:59   


 


 


 Piperacillin Sod/


 Tazobactam Sod


 3.375 gm/Dextrose  110 ml @ 


 27.5 mls/hr  EVERY 8  HOURS


 IVPB


   6/25/18 14:00


 6/30/18 13:59  6/26/18 05:52


 


 


 Sennosides


  (Senokot)  1 tab  BEDTIME


 ORAL


   6/25/18 21:00


 7/25/18 20:59   


 


 


 Tamsulosin HCl


  (Flomax)  0.4 mg  BEDTIME


 ORAL


   6/25/18 21:00


 7/25/18 20:59   


 


 


 Vitamin B Complex


  (Vitamin B


 Complex)  1 tab  BEDTIME


 ORAL


   6/25/18 21:00


 7/25/18 20:59   


 


 


 Vitamin D


  (Vitamin D)  1,000 intlu  DAILY


 ORAL


   6/25/18 09:00


 7/25/18 08:59   


 


 


 Warfarin Sodium


  (Coumadin per


 pharmacy)  1 ea  DAILY  PRN


 MISC


 Per rx protocol  6/25/18 08:30


 7/25/18 08:29   


 





Laboratory Tests


6/25/18 08:07: 


Arterial Blood pH 7.420, Arterial Blood Partial Pressure CO2 51.7H, Arterial 

Blood Partial Pressure O2 147.6H, Arterial Blood HCO3 32.8H, Arterial Blood 

Oxygen Saturation 98.6H, Arterial Blood Base Excess 7.2, Stuart Test Positive


6/26/18 04:20: 


White Blood Count 5.3, Red Blood Count 3.18L, Hemoglobin 10.3L, Hematocrit 32.8L

, Mean Corpuscular Volume 103H, Mean Corpuscular Hemoglobin 32.4H, Mean 

Corpuscular Hemoglobin Concent 31.3L, Red Cell Distribution Width 14.9H, 

Platelet Count 171, Mean Platelet Volume 6.6, Neutrophils (%) (Auto) 63.6, 

Lymphocytes (%) (Auto) 23.1, Monocytes (%) (Auto) 11.4H, Eosinophils (%) (Auto) 

1.4, Basophils (%) (Auto) 0.5, Prothrombin Time 12.0H, Prothromb Time 

International Ratio 1.1, Sodium Level 150H, Potassium Level 4.0, Chloride Level 

107, Carbon Dioxide Level 34H, Anion Gap 9, Blood Urea Nitrogen 55H, Creatinine 

1.9H, Estimat Glomerular Filtration Rate , Glucose Level 205H, Calcium Level 9.7

, Total Bilirubin 0.5, Aspartate Amino Transf (AST/SGOT) 17, Alanine 

Aminotransferase (ALT/SGPT) 11L, Alkaline Phosphatase 82, Total Protein 7.6, 

Albumin 3.0L, Globulin 4.6, Albumin/Globulin Ratio 0.7L, Triglycerides Level 146

, Cholesterol Level 207H, LDL Cholesterol 146H, HDL Cholesterol 45, Cholesterol/

HDL Ratio 4.6H


Height (Feet):  6


Height (Inches):  0.00


Weight (Pounds):  153


Objective


 exam stable, leonard indwelling, urine grossly yellow











[email or web address] Jun 26, 2018 07:33

## 2018-06-26 NOTE — GENERAL PROGRESS NOTE
Assessment/Plan


Problem List:  


(1) Anemia


ICD Codes:  D64.9 - Anemia, unspecified


SNOMED:  928440384


Qualifiers:  


   Qualified Codes:  D64.9 - Anemia, unspecified


(2) CKD (chronic kidney disease)


ICD Codes:  N18.9 - Chronic kidney disease, unspecified


SNOMED:  330461475


Qualifiers:  


   Qualified Codes:  N18.9 - Chronic kidney disease, unspecified


(3) Toxic metabolic encephalopathy


ICD Codes:  G92 - Toxic encephalopathy


SNOMED:  445979980


(4) CHF (congestive heart failure)


ICD Codes:  I50.9 - Heart failure, unspecified


SNOMED:  63906549


Qualifiers:  


   Qualified Codes:  I50.9 - Heart failure, unspecified


(5) UTI (urinary tract infection)


ICD Codes:  N39.0 - Urinary tract infection, site not specified


SNOMED:  20510650


Qualifiers:  


   Qualified Codes:  N30.00 - Acute cystitis without hematuria


Status:  stable, progressing


Assessment/Plan


iv abx


swallow eval


follow up cultures


hypotonic fluids


hold laisx per cards





Subjective


ROS Limited/Unobtainable:  No


Constitutional:  Reports: malaise, weakness


HEENT:  Reports: no symptoms


Cardiovascular:  Reports: no symptoms


Respiratory:  Reports: no symptoms


Gastrointestinal/Abdominal:  Reports: no symptoms


Genitourinary:  Reports: no symptoms


Neurologic/Psychiatric:  Reports: pre-existing deficit


Endocrine:  Reports: no symptoms


Hematologic/Lymphatic:  Reports: no symptoms


Allergies:  


Coded Allergies:  


     APIXABAN (Unverified  Allergy, Unknown, 6/25/18)


     BUMETANIDE (Unverified  Allergy, Unknown, 6/25/18)


     SPIRONOLACTONE (Unverified  Allergy, Unknown, 6/25/18)


All Systems:  reviewed and negative except above


Subjective


no events. w/o complaints. off bipap. feels better. labs reviewed. cards/id 

noted.





Objective





Last 24 Hour Vital Signs








  Date Time  Temp Pulse Resp B/P (MAP) Pulse Ox O2 Delivery O2 Flow Rate FiO2


 


6/26/18 07:13      Nasal Cannula 3.0 32


 


6/26/18 07:12  99 16  100 Nasal Cannula 3.0 32


 


6/26/18 06:55  75 16  98 Nasal Cannula 3.0 32


 


6/26/18 06:55     98 Nasal Cannula 3.0 32


 


6/26/18 04:00 98.7 75 20 100/65 100 Nasal Cannula 3.0 





 98.7       


 


6/26/18 04:00  75      


 


6/26/18 02:49  75 16  100 Nasal Cannula 3.0 32


 


6/26/18 02:42  75 16  100 Nasal Cannula 3.0 32


 


6/26/18 00:00 98.3 75 20 113/67 100 Nasal Cannula 3.0 





 98.3       


 


6/26/18 00:00  75      


 


6/25/18 22:52  75 20  100 Nasal Cannula 3.0 32


 


6/25/18 22:45  75 18  100 Nasal Cannula 3.0 32


 


6/25/18 22:32  62  115/68    


 


6/25/18 20:00 98.9 75 20 121/67 100 Nasal Cannula 3.0 





 98.9       


 


6/25/18 20:00  75      


 


6/25/18 19:22  75 18  100 Nasal Cannula 3.0 32


 


6/25/18 19:11  78 20  100 Nasal Cannula 3.0 32


 


6/25/18 19:07      Nasal Cannula 3.0 32


 


6/25/18 19:07     100 Nasal Cannula 3.0 32


 


6/25/18 16:00 98.7 75 20 111/60 100 Nasal Cannula 3.0 





 98.7       


 


6/25/18 16:00  75      


 


6/25/18 16:00       3.0 


 


6/25/18 15:07     99 Nasal Cannula 3.0 32


 


6/25/18 15:06  75 20  99 Nasal Cannula 3.0 32


 


6/25/18 14:55  75 22  100 Venturi Mask 8.0 40


 


6/25/18 14:14  78 22   Venturi Mask 8.0 40


 


6/25/18 12:37        40


 


6/25/18 12:10     96 Venturi Mask 8.0 40


 


6/25/18 12:10      Venturi Mask 8.0 40


 


6/25/18 12:00 98.6 75 20 105/59 100 Bi-pap  40





 98.6       


 


6/25/18 12:00  75      


 


6/25/18 11:39  75 19  98 Bi-pap  35


 


6/25/18 11:19  75 21  100 Bi-pap  35


 


6/25/18 11:19  75 21  100 Facial  35


 


6/25/18 10:30       15.0 35


 


6/25/18 10:11        35


 


6/25/18 10:00       15.0 50


 


6/25/18 09:34  75 16  100 Bi-pap  50


 


6/25/18 09:24  75 16  100 Bi-pap  50


 


6/25/18 09:00  75 17  100 Facial  50

















Intake and Output  


 


 6/25/18 6/26/18





 19:00 07:00


 


Intake Total 110.0 ml 137.5 ml


 


Output Total 1300 ml 600 ml


 


Balance -1190.0 ml -462.5 ml


 


  


 


IV Total 110.0 ml 137.5 ml


 


Output Urine Total 1300 ml 600 ml








Laboratory Tests


6/26/18 04:20: 


White Blood Count 5.3, Red Blood Count 3.18L, Hemoglobin 10.3L, Hematocrit 32.8L

, Mean Corpuscular Volume 103H, Mean Corpuscular Hemoglobin 32.4H, Mean 

Corpuscular Hemoglobin Concent 31.3L, Red Cell Distribution Width 14.9H, 

Platelet Count 171, Mean Platelet Volume 6.6, Neutrophils (%) (Auto) 63.6, 

Lymphocytes (%) (Auto) 23.1, Monocytes (%) (Auto) 11.4H, Eosinophils (%) (Auto) 

1.4, Basophils (%) (Auto) 0.5, Prothrombin Time 12.0H, Prothromb Time 

International Ratio 1.1, Sodium Level 150H, Potassium Level 4.0, Chloride Level 

107, Carbon Dioxide Level 34H, Anion Gap 9, Blood Urea Nitrogen 55H, Creatinine 

1.9H, Estimat Glomerular Filtration Rate , Glucose Level 205H, Calcium Level 9.7

, Total Bilirubin 0.5, Aspartate Amino Transf (AST/SGOT) 17, Alanine 

Aminotransferase (ALT/SGPT) 11L, Alkaline Phosphatase 82, Total Protein 7.6, 

Albumin 3.0L, Globulin 4.6, Albumin/Globulin Ratio 0.7L, Triglycerides Level 146

, Cholesterol Level 207H, LDL Cholesterol 146H, HDL Cholesterol 45, Cholesterol/

HDL Ratio 4.6H


Height (Feet):  6


Height (Inches):  0.00


Weight (Pounds):  153


General Appearance:  WD/WN, alert


Neck:  supple


Cardiovascular:  regular rhythm


Respiratory/Chest:  chest wall non-tender, lungs clear, normal breath sounds, 

no respiratory distress


Abdomen:  normal bowel sounds, non tender, soft, no organomegaly


Edema:  no edema noted Arm (L), no edema noted Arm (R), no edema noted Leg (L), 

no edema noted Leg (R), no edema noted Pedal (L), no edema noted Pedal (R), no 

edema noted Generalized











Francisco Gomes MD Jun 26, 2018 09:01

## 2018-06-26 NOTE — CONSULTATION
DATE OF CONSULTATION:  06/25/2018



CARDIOLOGY CONSULTATION



CONSULTING PHYSICIAN:  Henok Palma M.D.



REQUESTING PHYSICIAN:  Francisco Gomes M.D.



REASON FOR CONSULTATION:  Congestive heart failure in the setting of

cardiomyopathy with cardiac defibrillator.



HISTORY OF PRESENT ILLNESS:  This 87-year-old male with multiple

cardiopulmonary problems and a cardiac defibrillator was noted to be

increasingly withdrawn, lethargic, and poorly responsive prompting his

transfer to this emergency room.  A diagnostic workup was undertaken and

hospitalization initiated with concerns raised over an acute urinary

infection, sepsis, and congestive heart failure.  The patient was recently

hospitalized at [facility] for urinary infection and aspiration

pneumonia.  His course was complicated by cardiac arrhythmias and

congestive heart failure.  He also has had episodes of hypovolemia and

dehydration with acute renal failure.



PAST MEDICAL HISTORY:  Includes hypertensive heart disease, chronic

systolic and diastolic congestive heart failure, cardiac defibrillator,

paroxysmal atrial fibrillation, COPD, hypertensive heart disease,

non-insulin-requiring diabetes mellitus, prostatic hypertrophy, recurrent

urinary tract infections, chronic kidney disease.



MEDICATIONS:  Prior to admission, reviewed and reconciled.



ALLERGIES:  Include Aldactone, bumetanide, Apixaban.



FAMILY HISTORY:  Noncontributory.



SOCIAL HISTORY:  Prior smoker.  No alcohol or substance abuse at this

time.



REVIEW OF SYSTEMS:  Not obtainable from patient presently.



PHYSICAL EXAMINATION:

GENERAL:  Withdrawn and lethargic.

VITAL SIGNS:  Blood pressure 116/75, pulse 75, respiratory rate 20,

temperature 100.

HEENT:  Temporal wasting.  Pale conjunctivae.  Arcus senilis.  Oropharynx

clear.  Mucous membranes dry.

NECK:  Supple.  Jugular venous pressure is slightly elevated.  There is

some accessory muscle use.

LUNGS:  With coarse breath sounds.  Scattered rhonchi.

CARDIAC:  Regular rhythm and rate.  Normal S1, paradoxically split S2.  A

1/6 systolic apical murmur.

ABDOMEN:  Soft and nontender.

EXTREMITIES:  No clubbing, cyanosis, or edema.



LABORATORY DATA:  White count 5, hemoglobin 10.  Sodium 146, potassium 4.2,

chloride 107, bicarb 35, BUN 51, creatinine 2.2.  Troponin 0.074.

Urinalysis 40 to 60 white cells.  EKG reveals atrial fibrillation with

ventricular pacing.  Chest x-ray reveals left basilar and perihilar

atelectasis with no obvious infiltrate and left chest biventricular

defibrillator.  Natriuretic peptide 11,000.  Albumin 3.



IMPRESSION:

1. Urinary tract infection with sepsis.

2. Acute respiratory insufficiency.

3. Acute on chronic systolic and diastolic congestive heart failure.

4. Possible aspiration pneumonia.

5. Paroxysmal atrial fibrillation.

6. Cardiac defibrillator.

7. History of ventricular tachycardia.

8. Hypertensive heart disease.

9. Type 2 diabetes mellitus.

10. 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

11. Dehydration.

12. Hypernatremia.

13. Acute on chronic kidney injury.

14. Metabolic alkalosis.

15. Mild protein-calorie malnutrition.

16. Cardiac defibrillator.

17. History of cardiac arrhythmias, atrial and ventricular.

18. Acute myocardial ischemia and possible non-ST-elevation myocardial

infarction.



PLAN:

1. Hold diuretics.

2. Hypotonic IV fluids.

3. Antimicrobials.

4. DVT and stress ulcer prophylaxis.

5. Serial troponin levels.

6. Maximize anti-failure and antianginal regimen.

7. Initiate anti-platelet therapy with aspirin.

8. Continue with warfarin to INR 2-3 for cardioembolic prophylaxis.









  ______________________________________________

  Henok Palma M.D.





DR:  Estrellita

D:  06/25/2018 22:18

T:  06/26/2018 00:08

JOB#:  [id number]

CC:



JOSE

## 2018-06-27 VITALS — DIASTOLIC BLOOD PRESSURE: 55 MMHG | SYSTOLIC BLOOD PRESSURE: 102 MMHG

## 2018-06-27 VITALS — DIASTOLIC BLOOD PRESSURE: 62 MMHG | SYSTOLIC BLOOD PRESSURE: 116 MMHG

## 2018-06-27 VITALS — DIASTOLIC BLOOD PRESSURE: 69 MMHG | SYSTOLIC BLOOD PRESSURE: 115 MMHG

## 2018-06-27 VITALS — DIASTOLIC BLOOD PRESSURE: 73 MMHG | SYSTOLIC BLOOD PRESSURE: 118 MMHG

## 2018-06-27 VITALS — SYSTOLIC BLOOD PRESSURE: 101 MMHG | DIASTOLIC BLOOD PRESSURE: 63 MMHG

## 2018-06-27 VITALS — DIASTOLIC BLOOD PRESSURE: 65 MMHG | SYSTOLIC BLOOD PRESSURE: 116 MMHG

## 2018-06-27 LAB
ADD MANUAL DIFF: NO
ALBUMIN SERPL-MCNC: 2.8 G/DL (ref 3.4–5)
ALBUMIN/GLOB SERPL: 0.7 {RATIO} (ref 1–2.7)
ALP SERPL-CCNC: 76 U/L (ref 46–116)
ALT SERPL-CCNC: 13 U/L (ref 12–78)
ANION GAP SERPL CALC-SCNC: 10 MMOL/L (ref 5–15)
AST SERPL-CCNC: 22 U/L (ref 15–37)
BASOPHILS NFR BLD AUTO: 0.5 % (ref 0–2)
BILIRUB SERPL-MCNC: 0.6 MG/DL (ref 0.2–1)
BUN SERPL-MCNC: 52 MG/DL (ref 7–18)
CALCIUM SERPL-MCNC: 9.3 MG/DL (ref 8.5–10.1)
CHLORIDE SERPL-SCNC: 102 MMOL/L (ref 98–107)
CO2 SERPL-SCNC: 33 MMOL/L (ref 21–32)
CREAT SERPL-MCNC: 1.7 MG/DL (ref 0.55–1.3)
EOSINOPHIL NFR BLD AUTO: 1.4 % (ref 0–3)
ERYTHROCYTE [DISTWIDTH] IN BLOOD BY AUTOMATED COUNT: 14.6 % (ref 11.6–14.8)
GLOBULIN SER-MCNC: 4.2 G/DL
HCT VFR BLD CALC: 30.5 % (ref 42–52)
HGB BLD-MCNC: 10.1 G/DL (ref 14.2–18)
INR PPP: 1.4 (ref 0.9–1.1)
LYMPHOCYTES NFR BLD AUTO: 26.1 % (ref 20–45)
MCV RBC AUTO: 102 FL (ref 80–99)
MONOCYTES NFR BLD AUTO: 17.8 % (ref 1–10)
NEUTROPHILS NFR BLD AUTO: 54.2 % (ref 45–75)
PLATELET # BLD: 143 K/UL (ref 150–450)
POTASSIUM SERPL-SCNC: 3.2 MMOL/L (ref 3.5–5.1)
RBC # BLD AUTO: 2.99 M/UL (ref 4.7–6.1)
SODIUM SERPL-SCNC: 145 MMOL/L (ref 136–145)
WBC # BLD AUTO: 5.4 K/UL (ref 4.8–10.8)

## 2018-06-27 RX ADMIN — IPRATROPIUM BROMIDE AND ALBUTEROL SULFATE SCH ML: .5; 3 SOLUTION RESPIRATORY (INHALATION) at 03:44

## 2018-06-27 RX ADMIN — CARBIDOPA AND LEVODOPA SCH TAB: 25; 100 TABLET ORAL at 21:00

## 2018-06-27 RX ADMIN — DORZOLAMIDE HYDROCHLORIDE SCH DROP: 20 SOLUTION/ DROPS OPHTHALMIC at 08:58

## 2018-06-27 RX ADMIN — IPRATROPIUM BROMIDE AND ALBUTEROL SULFATE SCH ML: .5; 3 SOLUTION RESPIRATORY (INHALATION) at 22:58

## 2018-06-27 RX ADMIN — INSULIN ASPART SCH UNITS: 100 INJECTION, SOLUTION INTRAVENOUS; SUBCUTANEOUS at 17:18

## 2018-06-27 RX ADMIN — Medication SCH TAB: at 21:00

## 2018-06-27 RX ADMIN — DOCUSATE SODIUM SCH MG: 100 CAPSULE, LIQUID FILLED ORAL at 09:00

## 2018-06-27 RX ADMIN — DEXTROSE MONOHYDRATE SCH MLS/HR: 50 INJECTION, SOLUTION INTRAVENOUS at 06:09

## 2018-06-27 RX ADMIN — INSULIN ASPART SCH UNITS: 100 INJECTION, SOLUTION INTRAVENOUS; SUBCUTANEOUS at 12:31

## 2018-06-27 RX ADMIN — IPRATROPIUM BROMIDE AND ALBUTEROL SULFATE SCH ML: .5; 3 SOLUTION RESPIRATORY (INHALATION) at 11:18

## 2018-06-27 RX ADMIN — DOCUSATE SODIUM SCH MG: 100 CAPSULE, LIQUID FILLED ORAL at 18:00

## 2018-06-27 RX ADMIN — ASPIRIN 81 MG SCH MG: 81 TABLET ORAL at 09:00

## 2018-06-27 RX ADMIN — CARBIDOPA AND LEVODOPA SCH TAB: 25; 100 TABLET ORAL at 17:00

## 2018-06-27 RX ADMIN — CARBIDOPA AND LEVODOPA SCH TAB: 25; 100 TABLET ORAL at 13:00

## 2018-06-27 RX ADMIN — DIGOXIN SCH MG: 0.12 TABLET ORAL at 09:00

## 2018-06-27 RX ADMIN — INSULIN ASPART SCH UNITS: 100 INJECTION, SOLUTION INTRAVENOUS; SUBCUTANEOUS at 06:25

## 2018-06-27 RX ADMIN — DEXTROSE MONOHYDRATE SCH MLS/HR: 50 INJECTION, SOLUTION INTRAVENOUS at 21:01

## 2018-06-27 RX ADMIN — IPRATROPIUM BROMIDE AND ALBUTEROL SULFATE SCH ML: .5; 3 SOLUTION RESPIRATORY (INHALATION) at 19:25

## 2018-06-27 RX ADMIN — INSULIN DETEMIR SCH UNITS: 100 INJECTION, SOLUTION SUBCUTANEOUS at 08:57

## 2018-06-27 RX ADMIN — VITAMIN D, TAB 1000IU (100/BT) SCH INTLU: 25 TAB at 09:00

## 2018-06-27 RX ADMIN — CARVEDILOL SCH MG: 6.25 TABLET, FILM COATED ORAL at 09:00

## 2018-06-27 RX ADMIN — MEMANTINE HYDROCHLORIDE SCH MG: 10 TABLET ORAL at 18:00

## 2018-06-27 RX ADMIN — IPRATROPIUM BROMIDE AND ALBUTEROL SULFATE SCH ML: .5; 3 SOLUTION RESPIRATORY (INHALATION) at 07:13

## 2018-06-27 RX ADMIN — MEMANTINE HYDROCHLORIDE SCH MG: 10 TABLET ORAL at 09:00

## 2018-06-27 RX ADMIN — CARBIDOPA AND LEVODOPA SCH TAB: 25; 100 TABLET ORAL at 00:46

## 2018-06-27 RX ADMIN — CARBIDOPA AND LEVODOPA SCH TAB: 25; 100 TABLET ORAL at 09:00

## 2018-06-27 RX ADMIN — LATANOPROST SCH DROP: 50 SOLUTION OPHTHALMIC at 20:59

## 2018-06-27 RX ADMIN — CARVEDILOL SCH MG: 6.25 TABLET, FILM COATED ORAL at 20:59

## 2018-06-27 RX ADMIN — DEXTROSE MONOHYDRATE SCH MLS/HR: 50 INJECTION, SOLUTION INTRAVENOUS at 14:31

## 2018-06-27 RX ADMIN — INSULIN DETEMIR SCH UNITS: 100 INJECTION, SOLUTION SUBCUTANEOUS at 18:18

## 2018-06-27 RX ADMIN — Medication SCH MG: at 09:00

## 2018-06-27 RX ADMIN — MAGNESIUM HYDROXIDE SCH ML: 400 SUSPENSION ORAL at 09:00

## 2018-06-27 RX ADMIN — CARBIDOPA AND LEVODOPA SCH TAB: 25; 100 TABLET ORAL at 04:17

## 2018-06-27 RX ADMIN — TAMSULOSIN HYDROCHLORIDE SCH MG: 0.4 CAPSULE ORAL at 21:00

## 2018-06-27 RX ADMIN — DORZOLAMIDE HYDROCHLORIDE SCH DROP: 20 SOLUTION/ DROPS OPHTHALMIC at 17:17

## 2018-06-27 RX ADMIN — INSULIN ASPART SCH UNITS: 100 INJECTION, SOLUTION INTRAVENOUS; SUBCUTANEOUS at 21:11

## 2018-06-27 RX ADMIN — IPRATROPIUM BROMIDE AND ALBUTEROL SULFATE SCH ML: .5; 3 SOLUTION RESPIRATORY (INHALATION) at 15:17

## 2018-06-27 NOTE — GENERAL PROGRESS NOTE
Assessment/Plan


Problem List:  


(1) Anemia


ICD Codes:  D64.9 - Anemia, unspecified


SNOMED:  412389279


Qualifiers:  


   Qualified Codes:  D64.9 - Anemia, unspecified


(2) CKD (chronic kidney disease)


ICD Codes:  N18.9 - Chronic kidney disease, unspecified


SNOMED:  683820631


Qualifiers:  


   Qualified Codes:  N18.9 - Chronic kidney disease, unspecified


(3) Toxic metabolic encephalopathy


ICD Codes:  G92 - Toxic encephalopathy


SNOMED:  086475787


(4) CHF (congestive heart failure)


ICD Codes:  I50.9 - Heart failure, unspecified


SNOMED:  90391293


Qualifiers:  


   Qualified Codes:  I50.9 - Heart failure, unspecified


(5) UTI (urinary tract infection)


ICD Codes:  N39.0 - Urinary tract infection, site not specified


SNOMED:  97314624


Qualifiers:  


   Qualified Codes:  N30.00 - Acute cystitis without hematuria


Status:  stable, progressing


Assessment/Plan


iv abx


swallow eval/therapy


follow up cultures


hypotonic fluids


replace lytes


prn diuresis


repeat abg





Subjective


ROS Limited/Unobtainable:  Yes


Constitutional:  Reports: malaise, weakness


HEENT:  Reports: no symptoms


Cardiovascular:  Reports: edema


Respiratory:  Reports: SOB with excertion


Gastrointestinal/Abdominal:  Reports: difficulty swallowing


Genitourinary:  Reports: no symptoms


Neurologic/Psychiatric:  Reports: pre-existing deficit


Endocrine:  Reports: no symptoms


Hematologic/Lymphatic:  Reports: no symptoms


Allergies:  


Coded Allergies:  


     APIXABAN (Unverified  Allergy, Unknown, 6/25/18)


     BUMETANIDE (Unverified  Allergy, Unknown, 6/25/18)


     SPIRONOLACTONE (Unverified  Allergy, Unknown, 6/25/18)


All Systems:  reviewed and negative except above


Subjective


no events. w/o complaints. off bipap. feels better. labs reviewed. cards/id 

noted. 


difficulty swallowing. POA does not want gt/ngt. pt is more withdrawn than 

usual. awake and alert





Objective





Last 24 Hour Vital Signs








  Date Time  Temp Pulse Resp B/P (MAP) Pulse Ox O2 Delivery O2 Flow Rate FiO2


 


6/27/18 07:19  76 16  99 Nasal Cannula 2.0 28


 


6/27/18 07:12      Nasal Cannula 2.0 28


 


6/27/18 07:12     98 Nasal Cannula 2.0 28


 


6/27/18 07:12  74 16  98 Nasal Cannula 2.0 28


 


6/27/18 04:00  78      


 


6/27/18 04:00 96.8 75 20 118/73 95 Nasal Cannula 2.0 





 96.8       


 


6/27/18 03:55  72 18  100 Nasal Cannula 2.0 28


 


6/27/18 03:44  78 16  94 Nasal Cannula 2.0 28


 


6/27/18 00:00 97.7 76 20 116/65 100 Nasal Cannula 2.0 





 97.7       


 


6/27/18 00:00  76      


 


6/26/18 23:22  78 18  100 Nasal Cannula 2.0 28


 


6/26/18 23:21  71 16  100 Nasal Cannula 2.0 28


 


6/26/18 20:00  75      


 


6/26/18 20:00 98.1 75 22 102/65 100 Nasal Cannula 2.0 





 98.1       


 


6/26/18 19:00  75 16  100 Nasal Cannula 2.0 28


 


6/26/18 18:48     100 Nasal Cannula 2.0 28


 


6/26/18 18:48      Nasal Cannula 2.0 28


 


6/26/18 18:48  75 16  100 Nasal Cannula 2.0 28


 


6/26/18 16:21 99.0 75 24 118/66 99 Nasal Cannula 2.0 





 99.0       


 


6/26/18 15:54  78 16  100 Nasal Cannula 2.0 28


 


6/26/18 15:40  76 22  100 Nasal Cannula 2.0 28


 


6/26/18 15:22  78      


 


6/26/18 12:00 98.4 76 22 123/70 98 Nasal Cannula 2.0 





 98.4       


 


6/26/18 11:44  75      


 


6/26/18 11:12  83 16  98 Nasal Cannula 2.0 28


 


6/26/18 11:00  69 20  98 Nasal Cannula 3.0 32

















Intake and Output  


 


 6/26/18 6/27/18





 19:00 07:00


 


Intake Total 1242.5 ml 1235.0 ml


 


Output Total 400 ml 250 ml


 


Balance 842.5 ml 985.0 ml


 


  


 


IV Total 1242.5 ml 1235.0 ml


 


Output Urine Total 400 ml 250 ml








Laboratory Tests


6/27/18 04:16: 


White Blood Count 5.4, Red Blood Count 2.99L, Hemoglobin 10.1L, Hematocrit 30.5L

, Mean Corpuscular Volume 102H, Mean Corpuscular Hemoglobin 34.0H, Mean 

Corpuscular Hemoglobin Concent 33.2, Red Cell Distribution Width 14.6, Platelet 

Count 143L, Mean Platelet Volume 7.1, Neutrophils (%) (Auto) 54.2, Lymphocytes (

%) (Auto) 26.1, Monocytes (%) (Auto) 17.8H, Eosinophils (%) (Auto) 1.4, 

Basophils (%) (Auto) 0.5, Prothrombin Time 15.2H, Prothromb Time International 

Ratio 1.4H, Sodium Level 145, Potassium Level 3.2L, Chloride Level 102, Carbon 

Dioxide Level 33H, Anion Gap 10, Blood Urea Nitrogen 52H, Creatinine 1.7H, 

Estimat Glomerular Filtration Rate , Glucose Level 222H, Calcium Level 9.3, 

Total Bilirubin 0.6, Aspartate Amino Transf (AST/SGOT) 22, Alanine 

Aminotransferase (ALT/SGPT) 13, Alkaline Phosphatase 76, Troponin I 0.076H, Pro-

B-Type Natriuretic Peptide 6228H, Total Protein 7.0, Albumin 2.8L, Globulin 4.2

, Albumin/Globulin Ratio 0.7L, Digoxin Level 0.8


Height (Feet):  6


Height (Inches):  0.00


Weight (Pounds):  156


Objective


General Appearance:  WD/WN, alert


Neck:  supple


Cardiovascular:  regular rhythm


Respiratory/Chest:  chest wall non-tender, lungs clear, normal breath sounds, 

no respiratory distress


Abdomen:  normal bowel sounds, non tender, soft, no organomegaly


Edema:  no edema noted Arm (L), no edema noted Arm (R), no edema noted Leg (L), 

no edema noted Leg (R), no edema noted Pedal (L), no edema noted Pedal (R), no 

edema noted Generalized











Francisco Gomes MD Jun 27, 2018 08:29

## 2018-06-27 NOTE — PROGRESS NOTE
DATE:  06/26/2018



CARDIOLOGY PROGRESS NOTE



SUBJECTIVE:  The patient without new complaints.  Shortness of breath has

improved.  He is off BiPAP.  The patient remains on hypotonic IV fluids

with diuretics on hold.



PHYSICAL EXAMINATION:

VITAL SIGNS:  Blood pressure 100/65, pulse 75, respirations 20, and

afebrile.

LUNGS:  Bilateral breath sounds.  Few rhonchi.

HEART:  Regular rhythm and rate.  Normal S1, S2.  A 1/6 systolic murmur at

apex.

ABDOMEN:  Soft.

EXTREMITIES:  No edema.



LABORATORY DATA:  White count 5.3, hemoglobin 10.3.  Sodium 150, potassium

4, bicarbonate 34, BUN 55, and creatinine 1.9.  Albumin 3.0.  LDL

cholesterol 146.



IMPRESSION:

1. Dehydration.

2. Hypernatremia.

3. Acute on chronic kidney injury.

4. Acute myocardial ischemia, possible non-ST-elevation myocardial

infarction.

5. Acute on chronic systolic and diastolic congestive heart failure.

6. Mild protein-calorie malnutrition.

7. Cardiac defibrillator.

8. Paroxysmal atrial fibrillation.

9. Dyslipidemia.

10. Diabetes mellitus type 2.



PLAN:

1. Continue free water replacement.

2. Hold diuretics.

3. Antimicrobials per Infectious Disease consultant.

4. Add statin therapy.  INR goal 2 to 3.

5. Outpatient defibrillator interrogation.

6. Check digoxin level.

7. Titrate beta-blocker.

8. Warfarin to INR 2-3 for cardioembolic prophylaxis.









  ______________________________________________

  Henok Palma M.D. DR:  KECIA

D:  06/27/2018 01:48

T:  06/27/2018 03:54

JOB#:  [id number]

CC:



JOSE

## 2018-06-27 NOTE — UROLOGY PROGRESS NOTE
Assessment/Plan


Assessment/Plan


1. Pyuria and probable urinary tract infection.


2. Proteinuria.


3. Benign prostatic hypertrophy history.


4. Urinary retention.


5. Neurogenic bladder.


6. Chronic renal insufficiency.





leonard indwelling


abx as ordered


flomax and proscar


voiding trial?


cysto later





Subjective


Allergies:  


Coded Allergies:  


     APIXABAN (Unverified  Allergy, Unknown, 6/25/18)


     BUMETANIDE (Unverified  Allergy, Unknown, 6/25/18)


     SPIRONOLACTONE (Unverified  Allergy, Unknown, 6/25/18)


Subjective


non-verbal





Objective





Last 24 Hour Vital Signs








  Date Time  Temp Pulse Resp B/P (MAP) Pulse Ox O2 Delivery O2 Flow Rate FiO2


 


6/27/18 16:00  78      


 


6/27/18 15:52 98.4 76 19 115/69 99 Nasal Cannula 2.0 





 98.4       


 


6/27/18 15:22  77 16  100 Nasal Cannula 2.0 28


 


6/27/18 15:17  76 16  100 Nasal Cannula 2.0 28


 


6/27/18 12:12 97.8 75 18 101/63 100 Nasal Cannula 2.0 





 97.8       


 


6/27/18 12:05  77      


 


6/27/18 11:27  78 16  100 Nasal Cannula 2.0 28


 


6/27/18 11:18  78 16  100 Nasal Cannula 2.0 28


 


6/27/18 09:00  79  116/62    


 


6/27/18 09:00  79      


 


6/27/18 08:39 98.6 79 19 116/62 97 Nasal Cannula 2.0 





 98.6       


 


6/27/18 07:43  79      


 


6/27/18 07:19  76 16  99 Nasal Cannula 2.0 28


 


6/27/18 07:12      Nasal Cannula 2.0 28


 


6/27/18 07:12     98 Nasal Cannula 2.0 28


 


6/27/18 07:12  74 16  98 Nasal Cannula 2.0 28


 


6/27/18 04:00  78      


 


6/27/18 04:00 96.8 75 20 118/73 95 Nasal Cannula 2.0 





 96.8       


 


6/27/18 03:55  72 18  100 Nasal Cannula 2.0 28


 


6/27/18 03:44  78 16  94 Nasal Cannula 2.0 28


 


6/27/18 00:00 97.7 76 20 116/65 100 Nasal Cannula 2.0 





 97.7       


 


6/27/18 00:00  76      


 


6/26/18 23:22  78 18  100 Nasal Cannula 2.0 28


 


6/26/18 23:21  71 16  100 Nasal Cannula 2.0 28


 


6/26/18 20:00  75      


 


6/26/18 20:00 98.1 75 22 102/65 100 Nasal Cannula 2.0 





 98.1       


 


6/26/18 19:00  75 16  100 Nasal Cannula 2.0 28


 


6/26/18 18:48     100 Nasal Cannula 2.0 28


 


6/26/18 18:48      Nasal Cannula 2.0 28


 


6/26/18 18:48  75 16  100 Nasal Cannula 2.0 28

















Intake and Output  


 


 6/26/18 6/27/18





 19:00 07:00


 


Intake Total 1242.5 ml 1235.0 ml


 


Output Total 400 ml 250 ml


 


Balance 842.5 ml 985.0 ml


 


  


 


IV Total 1242.5 ml 1235.0 ml


 


Output Urine Total 400 ml 250 ml











Microbiology








 Date/Time


Source Procedure


Growth Status


 


 


 6/25/18 01:07


Blood Blood Culture - Preliminary


Gram Positive Cocci Resulted


 


 6/25/18 01:07


Nasal Nares MRSA Culture - Final


Staphylococcus Aureus - Mrsa Complete


 


 6/25/18 01:07


Urine,Clean Catch Urine Culture - Final


NO GROWTH AFTER 48 HOURS Complete





 6/25/18 01:07


Rectum VRE Culture - Final


Enterococcus Faecalis - Vre Resulted


 


 6/25/18 01:07


Rectum 


Pending Resulted








Current Medications








 Medications


  (Trade)  Dose


 Ordered  Sig/Mitra


 Route


 PRN Reason  Start Time


 Stop Time Status Last Admin


Dose Admin


 


 Acetaminophen


  (Tylenol)  650 mg  Q6H  PRN


 ORAL


 Mild Pain (Pain Scale 1-3)  6/25/18 08:15


 7/25/18 08:14   


 


 


 Albuterol/


 Ipratropium


  (Albuterol/


 Ipratropium)  3 ml  Q4HRT


 HHN


   6/25/18 11:00


 6/30/18 10:59  6/27/18 15:17


 


 


 Ascorbic Acid


  (Vitamin C)  500 mg  DAILY


 ORAL


   6/25/18 09:00


 7/25/18 08:59   


 


 


 Aspirin


  (ASA)  81 mg  DAILY


 ORAL


   6/26/18 09:00


 7/26/18 08:59   


 


 


 Atorvastatin


 Calcium


  (Lipitor)  10 mg  BEDTIME


 ORAL


   6/27/18 21:00


 7/27/18 20:59   


 


 


 Bisacodyl


  (Dulcolax)  10 mg  DAILYPRN  PRN


 RECTAL


 Constipation  6/25/18 08:15


 7/25/18 08:14   


 


 


 Calcium Carbonate


  (Os-Osmin)  1,250 mg  BIDPC


 ORAL


   6/25/18 09:00


 7/25/18 08:59   


 


 


 Carbidopa/Levodopa


  (Sinemet 25/100)  1 tab  Q4HR


 ORAL


   6/25/18 09:00


 7/25/18 08:59  6/26/18 12:04


 


 


 Carvedilol


  (Coreg)  6.25 mg  EVERY 12  HOURS


 ORAL


   6/27/18 09:00


 7/27/18 08:59   


 


 


 Clonidine HCl


  (Catapres Tab)  0.1 mg  Q4H  PRN


 ORAL


 SBP greater than 160  6/25/18 09:00


 7/25/18 08:59   


 


 


 Dextrose  1,000 ml @ 


 75 mls/hr  [id number]


 IV


   6/26/18 09:00


 7/26/18 08:59  6/27/18 12:31


 


 


 Dextrose


  (Dextrose 50%)  25 ml  STAT  PRN


 IV


 Hypoglycemia  6/25/18 08:15


 7/25/18 08:14   


 


 


 Dextrose


  (Dextrose 50%)  50 ml  STAT  PRN


 IV


 Hypoglycemia  6/25/18 08:15


 7/25/18 08:14   


 


 


 Digoxin


  (Lanoxin)  0.125 mg  DAILY


 ORAL


   6/25/18 09:00


 7/25/18 08:59   


 


 


 Docusate Sodium


  (Colace)  100 mg  TWICE A  DAY


 ORAL


   6/25/18 09:00


 7/25/18 08:59   


 


 


 Dorzolamide HCl


  (Trusopt)  1 drop  TWICE A  DAY


 RIGHT EYE


   6/25/18 10:00


 7/25/18 09:59  6/27/18 08:58


 


 


 Ferrous Sulfate


  (Feosol)  325 mg  THREE TIMES A  DAY


 ORAL


   6/25/18 09:00


 7/25/18 08:59  6/26/18 12:04


 


 


 Finasteride


  (Proscar)  5 mg  DAILY


 ORAL


   6/25/18 09:00


 7/25/18 08:59   


 


 


 Folic Acid


  (Folate)  1 mg  DAILY


 ORAL


   6/25/18 09:00


 7/25/18 08:59   


 


 


 Insulin Aspart


  (NovoLOG)    BEFORE MEALS AND  HS


 SUBQ


   6/25/18 11:30


 7/25/18 11:29  6/27/18 12:31


 


 


 Insulin Detemir


  (Levemir)  8 units  BID


 SUBQ


   6/25/18 09:00


 7/25/18 08:59  6/27/18 08:57


 


 


 Latanoprost


  (Xalatan)  1 drop  BEDTIME


 BOTH EYES


   6/25/18 21:00


 7/25/18 20:59  6/26/18 20:43


 


 


 Linezolid  300 ml @ 


 300 mls/hr  Q12HR@0400,1600


 IVPB


   6/26/18 16:00


 7/3/18 15:59  6/27/18 15:32


 


 


 Magnesium


 Hydroxide


  (Mom)  30 ml  DAILY


 ORAL


   6/25/18 09:00


 7/25/18 08:59   


 


 


 Memantine


  (Namenda)  10 mg  TWICE A  DAY


 ORAL


   6/25/18 09:00


 7/25/18 08:59   


 


 


 Multivitamins


  (Multivitamins)  1 tab  DAILY


 ORAL


   6/25/18 09:00


 7/25/18 08:59   


 


 


 Pantoprazole


  (Protonix)  40 mg  DAILY


 ORAL


   6/25/18 09:00


 7/25/18 08:59   


 


 


 Piperacillin Sod/


 Tazobactam Sod


 3.375 gm/Dextrose  110 ml @ 


 27.5 mls/hr  EVERY 8  HOURS


 IVPB


   6/25/18 14:00


 6/30/18 13:59  6/27/18 14:31


 


 


 Sennosides


  (Senokot)  1 tab  BEDTIME


 ORAL


   6/25/18 21:00


 7/25/18 20:59   


 


 


 Tamsulosin HCl


  (Flomax)  0.4 mg  BEDTIME


 ORAL


   6/25/18 21:00


 7/25/18 20:59   


 


 


 Vitamin B Complex


  (Vitamin B


 Complex)  1 tab  BEDTIME


 ORAL


   6/25/18 21:00


 7/25/18 20:59   


 


 


 Vitamin D


  (Vitamin D)  1,000 intlu  DAILY


 ORAL


   6/25/18 09:00


 7/25/18 08:59   


 


 


 Warfarin Sodium


  (Coumadin per


 pharmacy)  1 ea  DAILY  PRN


 MISC


 Per rx protocol  6/25/18 08:30


 7/25/18 08:29   


 





Laboratory Tests


6/27/18 04:16: 


White Blood Count 5.4, Red Blood Count 2.99L, Hemoglobin 10.1L, Hematocrit 30.5L

, Mean Corpuscular Volume 102H, Mean Corpuscular Hemoglobin 34.0H, Mean 

Corpuscular Hemoglobin Concent 33.2, Red Cell Distribution Width 14.6, Platelet 

Count 143L, Mean Platelet Volume 7.1, Neutrophils (%) (Auto) 54.2, Lymphocytes (

%) (Auto) 26.1, Monocytes (%) (Auto) 17.8H, Eosinophils (%) (Auto) 1.4, 

Basophils (%) (Auto) 0.5, Prothrombin Time 15.2H, Prothromb Time International 

Ratio 1.4H, Sodium Level 145, Potassium Level 3.2L, Chloride Level 102, Carbon 

Dioxide Level 33H, Anion Gap 10, Blood Urea Nitrogen 52H, Creatinine 1.7H, 

Estimat Glomerular Filtration Rate , Glucose Level 222H, Calcium Level 9.3, 

Total Bilirubin 0.6, Aspartate Amino Transf (AST/SGOT) 22, Alanine 

Aminotransferase (ALT/SGPT) 13, Alkaline Phosphatase 76, Troponin I 0.076H, Pro-

B-Type Natriuretic Peptide 6228H, Total Protein 7.0, Albumin 2.8L, Globulin 4.2

, Albumin/Globulin Ratio 0.7L, Digoxin Level 0.8


6/27/18 11:20: 


Arterial Blood pH 7.456H, Arterial Blood Partial Pressure CO2 45.6H, Arterial 

Blood Partial Pressure O2 97.2, Arterial Blood HCO3 31.4H, Arterial Blood 

Oxygen Saturation 97.6, Arterial Blood Base Excess 6.7, Stuart Test Positive


Height (Feet):  6


Height (Inches):  0.00


Weight (Pounds):  156


Objective


 exam stable, leonard indwelling, urine grossly yellow











BAMSHAD,SURENDRA Jun 27, 2018 17:20

## 2018-06-27 NOTE — INFECTIOUS DISEASES PROG NOTE
Assessment/Plan


Assessment/Plan


A


1. UTI


2. renal failure improving


3. COPD


4. CHF


5. diabetes


6. hypertension


7. Bacteremia


8.MRSA colonization





P


1. continue Zosyn & Zyvox


2. will follow up cultures





Subjective


ROS Limited/Unobtainable:  Yes


Allergies:  


Coded Allergies:  


     APIXABAN (Unverified  Allergy, Unknown, 6/25/18)


     BUMETANIDE (Unverified  Allergy, Unknown, 6/25/18)


     SPIRONOLACTONE (Unverified  Allergy, Unknown, 6/25/18)





Objective


Vital Signs





Last 24 Hour Vital Signs








  Date Time  Temp Pulse Resp B/P (MAP) Pulse Ox O2 Delivery O2 Flow Rate FiO2


 


6/27/18 08:39 98.6 79 19 116/62 97 Nasal Cannula 2.0 





 98.6       


 


6/27/18 07:43  79      


 


6/27/18 07:19  76 16  99 Nasal Cannula 2.0 28


 


6/27/18 07:12      Nasal Cannula 2.0 28


 


6/27/18 07:12     98 Nasal Cannula 2.0 28


 


6/27/18 07:12  74 16  98 Nasal Cannula 2.0 28


 


6/27/18 04:00  78      


 


6/27/18 04:00 96.8 75 20 118/73 95 Nasal Cannula 2.0 





 96.8       


 


6/27/18 03:55  72 18  100 Nasal Cannula 2.0 28


 


6/27/18 03:44  78 16  94 Nasal Cannula 2.0 28


 


6/27/18 00:00 97.7 76 20 116/65 100 Nasal Cannula 2.0 





 97.7       


 


6/27/18 00:00  76      


 


6/26/18 23:22  78 18  100 Nasal Cannula 2.0 28


 


6/26/18 23:21  71 16  100 Nasal Cannula 2.0 28


 


6/26/18 20:00  75      


 


6/26/18 20:00 98.1 75 22 102/65 100 Nasal Cannula 2.0 





 98.1       


 


6/26/18 19:00  75 16  100 Nasal Cannula 2.0 28


 


6/26/18 18:48     100 Nasal Cannula 2.0 28


 


6/26/18 18:48      Nasal Cannula 2.0 28


 


6/26/18 18:48  75 16  100 Nasal Cannula 2.0 28


 


6/26/18 16:21 99.0 75 24 118/66 99 Nasal Cannula 2.0 





 99.0       


 


6/26/18 15:54  78 16  100 Nasal Cannula 2.0 28


 


6/26/18 15:40  76 22  100 Nasal Cannula 2.0 28


 


6/26/18 15:22  78      


 


6/26/18 12:00 98.4 76 22 123/70 98 Nasal Cannula 2.0 





 98.4       


 


6/26/18 11:44  75      


 


6/26/18 11:12  83 16  98 Nasal Cannula 2.0 28


 


6/26/18 11:00  69 20  98 Nasal Cannula 3.0 32








Height (Feet):  6


Height (Inches):  0.00


Weight (Pounds):  156


General Appearance:  no acute distress


HEENT:  other - dry mouth


Respiratory/Chest:  lungs clear


Cardiovascular:  normal rate


Abdomen:  soft, non tender


Extremities:  no edema


Neurologic/Psychiatric:  aphasia, other - opens eyes





Microbiology








 Date/Time


Source Procedure


Growth Status


 


 


 6/25/18 01:07


Blood Blood Culture - Preliminary


Gram Positive Cocci Resulted


 


 6/25/18 01:07


Blood Blood Culture - Preliminary


Gram Positive Cocci Resulted


 


 6/25/18 01:07


Nasal Nares MRSA Culture - Final


Staphylococcus Aureus - Mrsa Complete


 


 6/25/18 01:07


Urine,Clean Catch Urine Culture - Preliminary


NO GROWTH AFTER 24 HOURS Resulted





 6/25/18 01:07


Rectum VRE Culture - Preliminary Resulted


 


 6/25/18 01:07


Rectum 


Pending Resulted











Laboratory Tests








Test


  6/27/18


04:16


 


White Blood Count


  5.4 K/UL


(4.8-10.8)


 


Red Blood Count


  2.99 M/UL


(4.70-6.10)  L


 


Hemoglobin


  10.1 G/DL


(14.2-18.0)  L


 


Hematocrit


  30.5 %


(42.0-52.0)  L


 


Mean Corpuscular Volume


  102 FL (80-99)


H


 


Mean Corpuscular Hemoglobin


  34.0 PG


(27.0-31.0)  H


 


Mean Corpuscular Hemoglobin


Concent 33.2 G/DL


(32.0-36.0)


 


Red Cell Distribution Width


  14.6 %


(11.6-14.8)


 


Platelet Count


  143 K/UL


(150-450)  L


 


Mean Platelet Volume


  7.1 FL


(6.5-10.1)


 


Neutrophils (%) (Auto)


  54.2 %


(45.0-75.0)


 


Lymphocytes (%) (Auto)


  26.1 %


(20.0-45.0)


 


Monocytes (%) (Auto)


  17.8 %


(1.0-10.0)  H


 


Eosinophils (%) (Auto)


  1.4 %


(0.0-3.0)


 


Basophils (%) (Auto)


  0.5 %


(0.0-2.0)


 


Prothrombin Time


  15.2 SEC


(9.30-11.50)  H


 


Prothromb Time International


Ratio 1.4 (0.9-1.1)


H


 


Sodium Level


  145 MMOL/L


(136-145)


 


Potassium Level


  3.2 MMOL/L


(3.5-5.1)  L


 


Chloride Level


  102 MMOL/L


()


 


Carbon Dioxide Level


  33 MMOL/L


(21-32)  H


 


Anion Gap


  10 mmol/L


(5-15)


 


Blood Urea Nitrogen


  52 mg/dL


(7-18)  H


 


Creatinine


  1.7 MG/DL


(0.55-1.30)  H


 


Estimat Glomerular Filtration


Rate  mL/min (>60)  


 


 


Glucose Level


  222 MG/DL


()  H


 


Calcium Level


  9.3 MG/DL


(8.5-10.1)


 


Total Bilirubin


  0.6 MG/DL


(0.2-1.0)


 


Aspartate Amino Transf


(AST/SGOT) 22 U/L (15-37)


 


 


Alanine Aminotransferase


(ALT/SGPT) 13 U/L (12-78)


 


 


Alkaline Phosphatase


  76 U/L


()


 


Troponin I


  0.076 ng/mL


(0.000-0.056)


 


Pro-B-Type Natriuretic Peptide


  6228 pg/mL


(0-125)  H


 


Total Protein


  7.0 G/DL


(6.4-8.2)


 


Albumin


  2.8 G/DL


(3.4-5.0)  L


 


Globulin 4.2 g/dL  


 


Albumin/Globulin Ratio


  0.7 (1.0-2.7)


L


 


Digoxin Level


  0.8 NG/ML


(0.5-2.0)











Current Medications








 Medications


  (Trade)  Dose


 Ordered  Sig/Mitra


 Route


 PRN Reason  Start Time


 Stop Time Status Last Admin


Dose Admin


 


 Acetaminophen


  (Tylenol)  650 mg  Q6H  PRN


 ORAL


 Mild Pain (Pain Scale 1-3)  6/25/18 08:15


 7/25/18 08:14   


 


 


 Albuterol/


 Ipratropium


  (Albuterol/


 Ipratropium)  3 ml  Q4HRT


 HHN


   6/25/18 11:00


 6/30/18 10:59  6/27/18 07:13


 


 


 Ascorbic Acid


  (Vitamin C)  500 mg  DAILY


 ORAL


   6/25/18 09:00


 7/25/18 08:59   


 


 


 Aspirin


  (ASA)  81 mg  DAILY


 ORAL


   6/26/18 09:00


 7/26/18 08:59   


 


 


 Atorvastatin


 Calcium


  (Lipitor)  10 mg  BEDTIME


 ORAL


   6/27/18 21:00


 7/27/18 20:59   


 


 


 Bisacodyl


  (Dulcolax)  10 mg  DAILYPRN  PRN


 RECTAL


 Constipation  6/25/18 08:15


 7/25/18 08:14   


 


 


 Calcium Carbonate


  (Os-Osmin)  1,250 mg  BIDPC


 ORAL


   6/25/18 09:00


 7/25/18 08:59   


 


 


 Carbidopa/Levodopa


  (Sinemet 25/100)  1 tab  Q4HR


 ORAL


   6/25/18 09:00


 7/25/18 08:59  6/26/18 12:04


 


 


 Carvedilol


  (Coreg)  6.25 mg  EVERY 12  HOURS


 ORAL


   6/27/18 09:00


 7/27/18 08:59   


 


 


 Clonidine HCl


  (Catapres Tab)  0.1 mg  Q4H  PRN


 ORAL


 SBP greater than 160  6/25/18 09:00


 7/25/18 08:59   


 


 


 Dextrose  1,000 ml @ 


 75 mls/hr  [id number]


 IV


   6/26/18 09:00


 7/26/18 08:59  6/26/18 21:57


 


 


 Dextrose


  (Dextrose 50%)  25 ml  STAT  PRN


 IV


 Hypoglycemia  6/25/18 08:15


 7/25/18 08:14   


 


 


 Dextrose


  (Dextrose 50%)  50 ml  STAT  PRN


 IV


 Hypoglycemia  6/25/18 08:15


 7/25/18 08:14   


 


 


 Digoxin


  (Lanoxin)  0.125 mg  DAILY


 ORAL


   6/25/18 09:00


 7/25/18 08:59   


 


 


 Docusate Sodium


  (Colace)  100 mg  TWICE A  DAY


 ORAL


   6/25/18 09:00


 7/25/18 08:59   


 


 


 Dorzolamide HCl


  (Trusopt)  1 drop  TWICE A  DAY


 RIGHT EYE


   6/25/18 10:00


 7/25/18 09:59  6/27/18 08:58


 


 


 Ferrous Sulfate


  (Feosol)  325 mg  THREE TIMES A  DAY


 ORAL


   6/25/18 09:00


 7/25/18 08:59  6/26/18 12:04


 


 


 Finasteride


  (Proscar)  5 mg  DAILY


 ORAL


   6/25/18 09:00


 7/25/18 08:59   


 


 


 Folic Acid


  (Folate)  1 mg  DAILY


 ORAL


   6/25/18 09:00


 7/25/18 08:59   


 


 


 Insulin Aspart


  (NovoLOG)    BEFORE MEALS AND  HS


 SUBQ


   6/25/18 11:30


 7/25/18 11:29  6/27/18 06:25


 


 


 Insulin Detemir


  (Levemir)  8 units  BID


 SUBQ


   6/25/18 09:00


 7/25/18 08:59  6/27/18 08:57


 


 


 Latanoprost


  (Xalatan)  1 drop  BEDTIME


 BOTH EYES


   6/25/18 21:00


 7/25/18 20:59  6/26/18 20:43


 


 


 Linezolid  300 ml @ 


 300 mls/hr  Q12HR@0400,1600


 IVPB


   6/26/18 16:00


 7/3/18 15:59  6/27/18 04:17


 


 


 Magnesium


 Hydroxide


  (Mom)  30 ml  DAILY


 ORAL


   6/25/18 09:00


 7/25/18 08:59   


 


 


 Memantine


  (Namenda)  10 mg  TWICE A  DAY


 ORAL


   6/25/18 09:00


 7/25/18 08:59   


 


 


 Multivitamins


  (Multivitamins)  1 tab  DAILY


 ORAL


   6/25/18 09:00


 7/25/18 08:59   


 


 


 Pantoprazole


  (Protonix)  40 mg  DAILY


 ORAL


   6/25/18 09:00


 7/25/18 08:59   


 


 


 Piperacillin Sod/


 Tazobactam Sod


 3.375 gm/Dextrose  110 ml @ 


 27.5 mls/hr  EVERY 8  HOURS


 IVPB


   6/25/18 14:00


 6/30/18 13:59  6/27/18 06:09


 


 


 Potassium Chloride  100 ml @ 


 100 mls/hr  Q1HR


 IVPB


   6/27/18 09:00


 6/27/18 11:59  6/27/18 09:02


 


 


 Sennosides


  (Senokot)  1 tab  BEDTIME


 ORAL


   6/25/18 21:00


 7/25/18 20:59   


 


 


 Tamsulosin HCl


  (Flomax)  0.4 mg  BEDTIME


 ORAL


   6/25/18 21:00


 7/25/18 20:59   


 


 


 Vitamin B Complex


  (Vitamin B


 Complex)  1 tab  BEDTIME


 ORAL


   6/25/18 21:00


 7/25/18 20:59   


 


 


 Vitamin D


  (Vitamin D)  1,000 intlu  DAILY


 ORAL


   6/25/18 09:00


 7/25/18 08:59   


 


 


 Warfarin Sodium


  (Coumadin per


 pharmacy)  1 ea  DAILY  PRN


 MISC


 Per rx protocol  6/25/18 08:30


 7/25/18 08:29   


 

















Yahir Chapa MD Jun 27, 2018 09:21

## 2018-06-28 VITALS — DIASTOLIC BLOOD PRESSURE: 55 MMHG | SYSTOLIC BLOOD PRESSURE: 108 MMHG

## 2018-06-28 VITALS — SYSTOLIC BLOOD PRESSURE: 93 MMHG | DIASTOLIC BLOOD PRESSURE: 52 MMHG

## 2018-06-28 VITALS — SYSTOLIC BLOOD PRESSURE: 103 MMHG | DIASTOLIC BLOOD PRESSURE: 50 MMHG

## 2018-06-28 VITALS — SYSTOLIC BLOOD PRESSURE: 102 MMHG | DIASTOLIC BLOOD PRESSURE: 56 MMHG

## 2018-06-28 VITALS — DIASTOLIC BLOOD PRESSURE: 59 MMHG | SYSTOLIC BLOOD PRESSURE: 102 MMHG

## 2018-06-28 VITALS — SYSTOLIC BLOOD PRESSURE: 110 MMHG | DIASTOLIC BLOOD PRESSURE: 55 MMHG

## 2018-06-28 LAB
ALBUMIN SERPL-MCNC: 2.6 G/DL (ref 3.4–5)
ALBUMIN/GLOB SERPL: 0.6 {RATIO} (ref 1–2.7)
ALP SERPL-CCNC: 67 U/L (ref 46–116)
ALT SERPL-CCNC: 16 U/L (ref 12–78)
ANION GAP SERPL CALC-SCNC: 9 MMOL/L (ref 5–15)
AST SERPL-CCNC: 27 U/L (ref 15–37)
BILIRUB SERPL-MCNC: 0.6 MG/DL (ref 0.2–1)
BUN SERPL-MCNC: 41 MG/DL (ref 7–18)
CALCIUM SERPL-MCNC: 8.4 MG/DL (ref 8.5–10.1)
CHLORIDE SERPL-SCNC: 97 MMOL/L (ref 98–107)
CO2 SERPL-SCNC: 31 MMOL/L (ref 21–32)
CREAT SERPL-MCNC: 1.4 MG/DL (ref 0.55–1.3)
GLOBULIN SER-MCNC: 4 G/DL
INR PPP: 1.7 (ref 0.9–1.1)
POTASSIUM SERPL-SCNC: 3 MMOL/L (ref 3.5–5.1)
SODIUM SERPL-SCNC: 137 MMOL/L (ref 136–145)

## 2018-06-28 RX ADMIN — CARBIDOPA AND LEVODOPA SCH TAB: 25; 100 TABLET ORAL at 17:00

## 2018-06-28 RX ADMIN — CARBIDOPA AND LEVODOPA SCH TAB: 25; 100 TABLET ORAL at 09:00

## 2018-06-28 RX ADMIN — LATANOPROST SCH DROP: 50 SOLUTION OPHTHALMIC at 20:53

## 2018-06-28 RX ADMIN — MEMANTINE HYDROCHLORIDE SCH MG: 10 TABLET ORAL at 09:00

## 2018-06-28 RX ADMIN — IPRATROPIUM BROMIDE AND ALBUTEROL SULFATE SCH ML: .5; 3 SOLUTION RESPIRATORY (INHALATION) at 08:00

## 2018-06-28 RX ADMIN — TAMSULOSIN HYDROCHLORIDE SCH MG: 0.4 CAPSULE ORAL at 21:00

## 2018-06-28 RX ADMIN — INSULIN DETEMIR SCH UNITS: 100 INJECTION, SOLUTION SUBCUTANEOUS at 17:48

## 2018-06-28 RX ADMIN — INSULIN ASPART SCH UNITS: 100 INJECTION, SOLUTION INTRAVENOUS; SUBCUTANEOUS at 20:56

## 2018-06-28 RX ADMIN — IPRATROPIUM BROMIDE AND ALBUTEROL SULFATE SCH ML: .5; 3 SOLUTION RESPIRATORY (INHALATION) at 22:27

## 2018-06-28 RX ADMIN — CARVEDILOL SCH MG: 6.25 TABLET, FILM COATED ORAL at 09:00

## 2018-06-28 RX ADMIN — DOCUSATE SODIUM SCH MG: 100 CAPSULE, LIQUID FILLED ORAL at 09:00

## 2018-06-28 RX ADMIN — DORZOLAMIDE HYDROCHLORIDE SCH DROP: 20 SOLUTION/ DROPS OPHTHALMIC at 09:24

## 2018-06-28 RX ADMIN — Medication SCH TAB: at 21:00

## 2018-06-28 RX ADMIN — MAGNESIUM HYDROXIDE SCH ML: 400 SUSPENSION ORAL at 09:00

## 2018-06-28 RX ADMIN — INSULIN DETEMIR SCH UNITS: 100 INJECTION, SOLUTION SUBCUTANEOUS at 09:25

## 2018-06-28 RX ADMIN — IPRATROPIUM BROMIDE AND ALBUTEROL SULFATE SCH ML: .5; 3 SOLUTION RESPIRATORY (INHALATION) at 10:48

## 2018-06-28 RX ADMIN — DEXTROSE MONOHYDRATE SCH MLS/HR: 50 INJECTION, SOLUTION INTRAVENOUS at 05:55

## 2018-06-28 RX ADMIN — CARBIDOPA AND LEVODOPA SCH TAB: 25; 100 TABLET ORAL at 21:00

## 2018-06-28 RX ADMIN — CARVEDILOL SCH MG: 6.25 TABLET, FILM COATED ORAL at 21:00

## 2018-06-28 RX ADMIN — DIGOXIN SCH MG: 0.12 TABLET ORAL at 09:00

## 2018-06-28 RX ADMIN — IPRATROPIUM BROMIDE AND ALBUTEROL SULFATE SCH ML: .5; 3 SOLUTION RESPIRATORY (INHALATION) at 19:00

## 2018-06-28 RX ADMIN — IPRATROPIUM BROMIDE AND ALBUTEROL SULFATE SCH ML: .5; 3 SOLUTION RESPIRATORY (INHALATION) at 14:41

## 2018-06-28 RX ADMIN — DORZOLAMIDE HYDROCHLORIDE SCH DROP: 20 SOLUTION/ DROPS OPHTHALMIC at 17:46

## 2018-06-28 RX ADMIN — IPRATROPIUM BROMIDE AND ALBUTEROL SULFATE SCH ML: .5; 3 SOLUTION RESPIRATORY (INHALATION) at 02:06

## 2018-06-28 RX ADMIN — Medication SCH MG: at 09:00

## 2018-06-28 RX ADMIN — CARBIDOPA AND LEVODOPA SCH TAB: 25; 100 TABLET ORAL at 00:12

## 2018-06-28 RX ADMIN — INSULIN ASPART SCH UNITS: 100 INJECTION, SOLUTION INTRAVENOUS; SUBCUTANEOUS at 17:48

## 2018-06-28 RX ADMIN — CARBIDOPA AND LEVODOPA SCH TAB: 25; 100 TABLET ORAL at 04:24

## 2018-06-28 RX ADMIN — INSULIN ASPART SCH UNITS: 100 INJECTION, SOLUTION INTRAVENOUS; SUBCUTANEOUS at 06:01

## 2018-06-28 RX ADMIN — DEXTROSE MONOHYDRATE SCH MLS/HR: 50 INJECTION, SOLUTION INTRAVENOUS at 21:41

## 2018-06-28 RX ADMIN — DEXTROSE MONOHYDRATE SCH MLS/HR: 50 INJECTION, SOLUTION INTRAVENOUS at 13:08

## 2018-06-28 RX ADMIN — INSULIN ASPART SCH UNITS: 100 INJECTION, SOLUTION INTRAVENOUS; SUBCUTANEOUS at 11:51

## 2018-06-28 RX ADMIN — CARBIDOPA AND LEVODOPA SCH TAB: 25; 100 TABLET ORAL at 13:00

## 2018-06-28 RX ADMIN — DOCUSATE SODIUM SCH MG: 100 CAPSULE, LIQUID FILLED ORAL at 18:00

## 2018-06-28 RX ADMIN — ASPIRIN 81 MG SCH MG: 81 TABLET ORAL at 09:00

## 2018-06-28 RX ADMIN — MEMANTINE HYDROCHLORIDE SCH MG: 10 TABLET ORAL at 18:00

## 2018-06-28 RX ADMIN — VITAMIN D, TAB 1000IU (100/BT) SCH INTLU: 25 TAB at 09:00

## 2018-06-28 NOTE — PROGRESS NOTE
DATE:  06/27/2018



CARDIOLOGY PROGRESS NOTE



SUBJECTIVE:  The patient is off BiPAP.  He feels better.  No new

complaints.  He continues to have some episodes of dysphagia.  Power of

 has refused to have any artificial source of feeding and

nutrition.



The patient is on hypotonic IV fluids.  Monitored rhythm is paced.



OBJECTIVE:

VITAL SIGNS:  Blood pressure 102/55, pulse 75, respirations 20, and

afebrile.

LUNGS:  Coarse breath sounds.  Scattered rhonchi.  No wheezing.

HEART:  Regular rhythm and rate.  Normal S1, paradoxically split

S2.

ABDOMEN:  Soft, nontender.

EXTREMITIES:  With trace dependent edema.



LABORATORY DATA:  White count 5.4, hemoglobin 10.1.  Sodium 145, potassium

3.2, bicarbonate 33, BUN 52, and creatinine 1.7.  Troponin is 0.076.

Pro-natriuretic peptide _____.  Albumin 3.8.  ABG today, 7.45, 45, 97.



IMPRESSION:

1. Slow progress, improved overall, still high risk due to advanced

cardiopulmonary disease.

2. 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

3. Chronic obstructive pulmonary disease with exacerbation.

4. Anemia.

5. Dehydration.

6. Hypernatremia.

7. Hypokalemia.

8. Acute myocardial ischemia.

9. Moderate protein-calorie malnutrition.

10. Hyperlipidemia.



PLAN:

1. Antianginal and anti-platelet therapy ongoing.

2. Potassium replacement.

3. Check magnesium.

4. Free water replacement.

5. Protein supplement.

6. Antimicrobials.

7. Respiratory hygiene.









  ______________________________________________

  Henok Palma M.D. DR:  KECIA

D:  06/28/2018 01:04

T:  06/28/2018 04:33

JOB#:  [id number]

CC:

## 2018-06-28 NOTE — GENERAL PROGRESS NOTE
Assessment/Plan


Problem List:  


(1) Anemia


ICD Codes:  D64.9 - Anemia, unspecified


SNOMED:  335094690


Qualifiers:  


   Qualified Codes:  D64.9 - Anemia, unspecified


(2) CKD (chronic kidney disease)


ICD Codes:  N18.9 - Chronic kidney disease, unspecified


SNOMED:  643832377


Qualifiers:  


   Qualified Codes:  N18.9 - Chronic kidney disease, unspecified


(3) Toxic metabolic encephalopathy


ICD Codes:  G92 - Toxic encephalopathy


SNOMED:  210168725


(4) CHF (congestive heart failure)


ICD Codes:  I50.9 - Heart failure, unspecified


SNOMED:  48225661


Qualifiers:  


   Qualified Codes:  I50.9 - Heart failure, unspecified


(5) UTI (urinary tract infection)


ICD Codes:  N39.0 - Urinary tract infection, site not specified


SNOMED:  41920121


Qualifiers:  


   Qualified Codes:  N30.00 - Acute cystitis without hematuria


Status:  stable


Assessment/Plan


iv abx


swallow eval/therapy


no gt/ngt per conservator


follow up cultures


hypotonic fluids


replace lytes


prn diuresis


monitor fluid status





Subjective


ROS Limited/Unobtainable:  Yes


Constitutional:  Reports: malaise, weakness


HEENT:  Reports: no symptoms


Cardiovascular:  Reports: edema


Respiratory:  Reports: shortness of breath


Gastrointestinal/Abdominal:  Reports: difficulty swallowing


Genitourinary:  Reports: no symptoms


Neurologic/Psychiatric:  Reports: pre-existing deficit


Endocrine:  Reports: no symptoms


Hematologic/Lymphatic:  Reports: no symptoms


Allergies:  


Coded Allergies:  


     APIXABAN (Unverified  Allergy, Unknown, 6/25/18)


     BUMETANIDE (Unverified  Allergy, Unknown, 6/25/18)


     SPIRONOLACTONE (Unverified  Allergy, Unknown, 6/25/18)


All Systems:  reviewed and negative except above


Subjective


no events. w/o complaints. off bipap. feels better. labs reviewed. cards/id 

noted. 


difficulty swallowing. not hungry. on ivf. low k noted. renal fxn improving.





Objective





Last 24 Hour Vital Signs








  Date Time  Temp Pulse Resp B/P (MAP) Pulse Ox O2 Delivery O2 Flow Rate FiO2


 


6/28/18 16:49 97.6 76 18 103/50 100 Nasal Cannula 2.0 





 97.6       


 


6/28/18 16:00  79      


 


6/28/18 14:52  58 20  99 Nasal Cannula 2.0 28


 


6/28/18 14:43  52 20  97 Nasal Cannula 2.0 28


 


6/28/18 12:00 97.5 80 17 102/56 95 Nasal Cannula 2.0 





 97.5       


 


6/28/18 12:00  75      


 


6/28/18 11:00  42 20  99 Nasal Cannula 2.0 28 6/28/18 10:49  40 20  99 Nasal Cannula 2.0 28 6/28/18 09:00  70  102/59    


 


6/28/18 09:00  70      


 


6/28/18 08:28 97.5 70 22 102/59 99 Nasal Cannula 2.0 





 97.5       


 


6/28/18 08:12  71 20  97 Nasal Cannula 2.0 28 6/28/18 08:12  74 20  98 Nasal Cannula 2.0 28 6/28/18 08:12      Nasal Cannula 2.0 28 6/28/18 08:11     97 Nasal Cannula 2.0 28 6/28/18 08:00  75      


 


6/28/18 04:00  89      


 


6/28/18 04:00 97.2 77 20 93/52 100 Nasal Cannula 2.0 





 97.2       


 


6/28/18 02:15  70 16  100 Nasal Cannula 2.0 28 6/28/18 02:07  67 20  98 Nasal Cannula 2.0 28 6/28/18 00:00  82      


 


6/28/18 00:00 97.4 73 20 110/55 100 Nasal Cannula 2.0 





 97.4       


 


6/27/18 23:09  78 16  99 Nasal Cannula 2.0 28 6/27/18 22:58  75 20  96 Nasal Cannula 2.0 28 6/27/18 20:59  75  102/55    


 


6/27/18 20:00 97.4 75 20 102/55 100 Nasal Cannula 2.0 





 97.4       


 


6/27/18 20:00  75      


 


6/27/18 19:37  80 16  100 Nasal Cannula 2.0 28 6/27/18 19:24      Nasal Cannula 2.0 28 6/27/18 19:24     98 Nasal Cannula 2.0 28 6/27/18 19:24  78 16  98 Nasal Cannula 2.0 28

















Intake and Output  


 


 6/27/18 6/28/18





 19:00 07:00


 


Intake Total 1170.0 ml 1367.5 ml


 


Output Total 800 ml 350 ml


 


Balance 370.0 ml 1017.5 ml


 


  


 


Intake Oral  30 ml


 


IV Total 1170.0 ml 1337.5 ml


 


Output Urine Total 800 ml 350 ml


 


# Bowel Movements  1








Laboratory Tests


6/28/18 03:54: 


Prothrombin Time 17.9H, Prothromb Time International Ratio 1.7H, Sodium Level 

137, Potassium Level 3.0L, Chloride Level 97L, Carbon Dioxide Level 31, Anion 

Gap 9, Blood Urea Nitrogen 41H, Creatinine 1.4H, Estimat Glomerular Filtration 

Rate , Glucose Level 182H, Calcium Level 8.4L, Total Bilirubin 0.6, Aspartate 

Amino Transf (AST/SGOT) 27, Alanine Aminotransferase (ALT/SGPT) 16, Alkaline 

Phosphatase 67, Total Protein 6.6, Albumin 2.6L, Globulin 4.0, Albumin/Globulin 

Ratio 0.6L


Height (Feet):  6


Height (Inches):  0.00


Weight (Pounds):  155


Objective


General Appearance:  WD/WN, alert


Neck:  supple


Cardiovascular:  regular rhythm


Respiratory/Chest:  chest wall non-tender, lungs clear, normal breath sounds, 

no respiratory distress


Abdomen:  normal bowel sounds, non tender, soft, no organomegaly


Edema:  no edema noted Arm (L), no edema noted Arm (R), no edema noted Leg (L), 

no edema noted Leg (R), no edema noted Pedal (L), no edema noted Pedal (R), no 

edema noted Generalized











Francisco Gomes MD Jun 28, 2018 16:56

## 2018-06-28 NOTE — UROLOGY PROGRESS NOTE
Assessment/Plan


Assessment/Plan


1. Pyuria and probable urinary tract infection.


2. Proteinuria.


3. Benign prostatic hypertrophy history.


4. Urinary retention.


5. Neurogenic bladder.


6. Chronic renal insufficiency.





leonard indwelling


abx as ordered


flomax and proscar


voiding trial?


cysto later





Subjective


Allergies:  


Coded Allergies:  


     APIXABAN (Unverified  Allergy, Unknown, 6/25/18)


     BUMETANIDE (Unverified  Allergy, Unknown, 6/25/18)


     SPIRONOLACTONE (Unverified  Allergy, Unknown, 6/25/18)


Subjective


non-verbal





Objective





Last 24 Hour Vital Signs








  Date Time  Temp Pulse Resp B/P (MAP) Pulse Ox O2 Delivery O2 Flow Rate FiO2


 


6/28/18 16:49 97.6 76 18 103/50 100 Nasal Cannula 2.0 





 97.6       


 


6/28/18 16:00  79      


 


6/28/18 14:52  58 20  99 Nasal Cannula 2.0 28


 


6/28/18 14:43  52 20  97 Nasal Cannula 2.0 28


 


6/28/18 12:00 97.5 80 17 102/56 95 Nasal Cannula 2.0 





 97.5       


 


6/28/18 12:00  75      


 


6/28/18 11:00  42 20  99 Nasal Cannula 2.0 28 6/28/18 10:49  40 20  99 Nasal Cannula 2.0 28 6/28/18 09:00  70  102/59    


 


6/28/18 09:00  70      


 


6/28/18 08:28 97.5 70 22 102/59 99 Nasal Cannula 2.0 





 97.5       


 


6/28/18 08:12  71 20  97 Nasal Cannula 2.0 28


 


6/28/18 08:12  74 20  98 Nasal Cannula 2.0 28


 


6/28/18 08:12      Nasal Cannula 2.0 28


 


6/28/18 08:11     97 Nasal Cannula 2.0 28


 


6/28/18 08:00  75      


 


6/28/18 04:00  89      


 


6/28/18 04:00 97.2 77 20 93/52 100 Nasal Cannula 2.0 





 97.2       


 


6/28/18 02:15  70 16  100 Nasal Cannula 2.0 28


 


6/28/18 02:07  67 20  98 Nasal Cannula 2.0 28


 


6/28/18 00:00  82      


 


6/28/18 00:00 97.4 73 20 110/55 100 Nasal Cannula 2.0 





 97.4       


 


6/27/18 23:09  78 16  99 Nasal Cannula 2.0 28


 


6/27/18 22:58  75 20  96 Nasal Cannula 2.0 28


 


6/27/18 20:59  75  102/55    


 


6/27/18 20:00 97.4 75 20 102/55 100 Nasal Cannula 2.0 





 97.4       


 


6/27/18 20:00  75      


 


6/27/18 19:37  80 16  100 Nasal Cannula 2.0 28


 


6/27/18 19:24      Nasal Cannula 2.0 28


 


6/27/18 19:24     98 Nasal Cannula 2.0 28


 


6/27/18 19:24  78 16  98 Nasal Cannula 2.0 28

















Intake and Output  


 


 6/27/18 6/28/18





 19:00 07:00


 


Intake Total 1170.0 ml 1367.5 ml


 


Output Total 800 ml 350 ml


 


Balance 370.0 ml 1017.5 ml


 


  


 


Intake Oral  30 ml


 


IV Total 1170.0 ml 1337.5 ml


 


Output Urine Total 800 ml 350 ml


 


# Bowel Movements  1











Microbiology








 Date/Time


Source Procedure


Growth Status


 


 


 6/25/18 01:07


Blood Blood Culture - Final


Staphylococcus Sp Coag Neg Complete


 


 6/25/18 01:07


Nasal Nares MRSA Culture - Final


Staphylococcus Aureus - Mrsa Complete


 


 6/25/18 01:07


Urine,Clean Catch Urine Culture - Final


NO GROWTH AFTER 48 HOURS Complete





 6/25/18 01:07


Rectum VRE Culture - Final


Enterococcus Faecalis - Vre Complete


 


 6/25/18 01:07


Rectum - Final


NO CARBAPENEM-RESISTANT ENTEROBACTERI... Complete








Current Medications








 Medications


  (Trade)  Dose


 Ordered  Sig/Mitra


 Route


 PRN Reason  Start Time


 Stop Time Status Last Admin


Dose Admin


 


 Acetaminophen


  (Tylenol)  650 mg  Q6H  PRN


 ORAL


 Mild Pain (Pain Scale 1-3)  6/25/18 08:15


 7/25/18 08:14   


 


 


 Albuterol/


 Ipratropium


  (Albuterol/


 Ipratropium)  3 ml  Q4HRT


 HHN


   6/25/18 11:00


 6/30/18 10:59  6/28/18 14:41


 


 


 Ascorbic Acid


  (Vitamin C)  500 mg  DAILY


 ORAL


   6/25/18 09:00


 7/25/18 08:59   


 


 


 Aspirin


  (ASA)  81 mg  DAILY


 ORAL


   6/26/18 09:00


 7/26/18 08:59   


 


 


 Atorvastatin


 Calcium


  (Lipitor)  10 mg  BEDTIME


 ORAL


   6/27/18 21:00


 7/27/18 20:59   


 


 


 Bisacodyl


  (Dulcolax)  10 mg  DAILYPRN  PRN


 RECTAL


 Constipation  6/25/18 08:15


 7/25/18 08:14   


 


 


 Calcium Carbonate


  (Os-Osmin)  1,250 mg  BIDPC


 ORAL


   6/25/18 09:00


 7/25/18 08:59   


 


 


 Carbidopa/Levodopa


  (Sinemet 25/100)  1 tab  Q4HR


 ORAL


   6/25/18 09:00


 7/25/18 08:59  6/28/18 00:12


 


 


 Carvedilol


  (Coreg)  6.25 mg  EVERY 12  HOURS


 ORAL


   6/27/18 09:00


 7/27/18 08:59   


 


 


 Clonidine HCl


  (Catapres Tab)  0.1 mg  Q4H  PRN


 ORAL


 SBP greater than 160  6/25/18 09:00


 7/25/18 08:59   


 


 


 Dextrose


  (Dextrose 50%)  25 ml  STAT  PRN


 IV


 Hypoglycemia  6/25/18 08:15


 7/25/18 08:14   


 


 


 Dextrose


  (Dextrose 50%)  50 ml  STAT  PRN


 IV


 Hypoglycemia  6/25/18 08:15


 7/25/18 08:14   


 


 


 Digoxin


  (Lanoxin)  0.125 mg  DAILY


 ORAL


   6/25/18 09:00


 7/25/18 08:59   


 


 


 Docusate Sodium


  (Colace)  100 mg  TWICE A  DAY


 ORAL


   6/25/18 09:00


 7/25/18 08:59   


 


 


 Dorzolamide HCl


  (Trusopt)  1 drop  TWICE A  DAY


 RIGHT EYE


   6/25/18 10:00


 7/25/18 09:59  6/28/18 17:46


 


 


 Ferrous Sulfate


  (Feosol)  325 mg  THREE TIMES A  DAY


 ORAL


   6/25/18 09:00


 7/25/18 08:59  6/26/18 12:04


 


 


 Finasteride


  (Proscar)  5 mg  DAILY


 ORAL


   6/25/18 09:00


 7/25/18 08:59   


 


 


 Folic Acid


  (Folate)  1 mg  DAILY


 ORAL


   6/25/18 09:00


 7/25/18 08:59   


 


 


 Insulin Aspart


  (NovoLOG)    BEFORE MEALS AND  HS


 SUBQ


   6/25/18 11:30


 7/25/18 11:29  6/28/18 17:48


 


 


 Insulin Detemir


  (Levemir)  8 units  BID


 SUBQ


   6/25/18 09:00


 7/25/18 08:59  6/28/18 17:48


 


 


 Latanoprost


  (Xalatan)  1 drop  BEDTIME


 BOTH EYES


   6/25/18 21:00


 7/25/18 20:59  6/27/18 20:59


 


 


 Magnesium


 Hydroxide


  (Mom)  30 ml  DAILY


 ORAL


   6/25/18 09:00


 7/25/18 08:59   


 


 


 Memantine


  (Namenda)  10 mg  TWICE A  DAY


 ORAL


   6/25/18 09:00


 7/25/18 08:59   


 


 


 Multivitamins


  (Multivitamins)  1 tab  DAILY


 ORAL


   6/25/18 09:00


 7/25/18 08:59   


 


 


 Pantoprazole


  (Protonix)  40 mg  DAILY


 ORAL


   6/25/18 09:00


 7/25/18 08:59   


 


 


 Piperacillin Sod/


 Tazobactam Sod


 3.375 gm/Dextrose  110 ml @ 


 27.5 mls/hr  EVERY 8  HOURS


 IVPB


   6/25/18 14:00


 6/30/18 13:59  6/28/18 13:08


 


 


 Sennosides


  (Senokot)  1 tab  BEDTIME


 ORAL


   6/25/18 21:00


 7/25/18 20:59   


 


 


 Tamsulosin HCl


  (Flomax)  0.4 mg  BEDTIME


 ORAL


   6/25/18 21:00


 7/25/18 20:59   


 


 


 Vitamin B Complex


  (Vitamin B


 Complex)  1 tab  BEDTIME


 ORAL


   6/25/18 21:00


 7/25/18 20:59   


 


 


 Vitamin D


  (Vitamin D)  1,000 intlu  DAILY


 ORAL


   6/25/18 09:00


 7/25/18 08:59   


 


 


 Warfarin Sodium


  (Coumadin per


 pharmacy)  1 ea  DAILY  PRN


 MISC


 Per rx protocol  6/25/18 08:30


 7/25/18 08:29   


 





Laboratory Tests


6/28/18 03:54: 


Prothrombin Time 17.9H, Prothromb Time International Ratio 1.7H, Sodium Level 

137, Potassium Level 3.0L, Chloride Level 97L, Carbon Dioxide Level 31, Anion 

Gap 9, Blood Urea Nitrogen 41H, Creatinine 1.4H, Estimat Glomerular Filtration 

Rate , Glucose Level 182H, Calcium Level 8.4L, Total Bilirubin 0.6, Aspartate 

Amino Transf (AST/SGOT) 27, Alanine Aminotransferase (ALT/SGPT) 16, Alkaline 

Phosphatase 67, Total Protein 6.6, Albumin 2.6L, Globulin 4.0, Albumin/Globulin 

Ratio 0.6L


Height (Feet):  6


Height (Inches):  0.00


Weight (Pounds):  155


Objective


 exam stable, leonard indwelling, urine grossly yellow











SURENDRA SHEFFIELD Jun 28, 2018 17:53

## 2018-06-28 NOTE — INFECTIOUS DISEASES PROG NOTE
Assessment/Plan


Assessment/Plan


A


1. UTI


2. renal failure improving


3. COPD


4. CHF


5. diabetes


6. hypertension


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


8.MRSA colonization





P


1. continue Zosyn , discontinue Zyvox


2. will follow up cultures





Subjective


ROS Limited/Unobtainable:  Yes


Constitutional:  Reports: anorexia


Neurologic:  Reports: other - more alert


Allergies:  


Coded Allergies:  


     APIXABAN (Unverified  Allergy, Unknown, 6/25/18)


     BUMETANIDE (Unverified  Allergy, Unknown, 6/25/18)


     SPIRONOLACTONE (Unverified  Allergy, Unknown, 6/25/18)





Objective


Vital Signs





Last 24 Hour Vital Signs








  Date Time  Temp Pulse Resp B/P (MAP) Pulse Ox O2 Delivery O2 Flow Rate FiO2


 


6/28/18 09:00  70  102/59    


 


6/28/18 09:00  70      


 


6/28/18 08:28 97.5 70 22 102/59 99 Nasal Cannula 2.0 





 97.5       


 


6/28/18 08:12  71 20  97 Nasal Cannula 2.0 28


 


6/28/18 08:12  74 20  98 Nasal Cannula 2.0 28


 


6/28/18 08:12      Nasal Cannula 2.0 28


 


6/28/18 08:11     97 Nasal Cannula 2.0 28


 


6/28/18 08:00  75      


 


6/28/18 04:00  89      


 


6/28/18 04:00 97.2 77 20 93/52 100 Nasal Cannula 2.0 





 97.2       


 


6/28/18 02:15  70 16  100 Nasal Cannula 2.0 28 6/28/18 02:07  67 20  98 Nasal Cannula 2.0 28 6/28/18 00:00  82      


 


6/28/18 00:00 97.4 73 20 110/55 100 Nasal Cannula 2.0 





 97.4       


 


6/27/18 23:09  78 16  99 Nasal Cannula 2.0 28


 


6/27/18 22:58  75 20  96 Nasal Cannula 2.0 28


 


6/27/18 20:59  75  102/55    


 


6/27/18 20:00 97.4 75 20 102/55 100 Nasal Cannula 2.0 





 97.4       


 


6/27/18 20:00  75      


 


6/27/18 19:37  80 16  100 Nasal Cannula 2.0 28


 


6/27/18 19:24      Nasal Cannula 2.0 28


 


6/27/18 19:24     98 Nasal Cannula 2.0 28


 


6/27/18 19:24  78 16  98 Nasal Cannula 2.0 28


 


6/27/18 16:00  78      


 


6/27/18 15:52 98.4 76 19 115/69 99 Nasal Cannula 2.0 





 98.4       


 


6/27/18 15:22  77 16  100 Nasal Cannula 2.0 28


 


6/27/18 15:17  76 16  100 Nasal Cannula 2.0 28


 


6/27/18 12:12 97.8 75 18 101/63 100 Nasal Cannula 2.0 





 97.8       


 


6/27/18 12:05  77      


 


6/27/18 11:27  78 16  100 Nasal Cannula 2.0 28


 


6/27/18 11:18  78 16  100 Nasal Cannula 2.0 28








Height (Feet):  6


Height (Inches):  0.00


Weight (Pounds):  155


General Appearance:  no acute distress


HEENT:  mucous membranes moist


Respiratory/Chest:  lungs clear


Cardiovascular:  normal rate


Abdomen:  soft, non tender


Extremities:  no edema


Neurologic/Psychiatric:  other - sleeping





Laboratory Tests








Test


  6/27/18


11:20 6/28/18


03:54


 


Arterial Blood pH


  7.456


(7.350-7.450) 


 


 


Arterial Blood Partial


Pressure CO2 45.6 mmHg


(35.0-45.0)  H 


 


 


Arterial Blood Partial


Pressure O2 97.2 mmHg


(75.0-100.0) 


 


 


Arterial Blood HCO3


  31.4 mmol/L


(22.0-26.0)  H 


 


 


Arterial Blood Oxygen


Saturation 97.6 %


(92.0-98.0) 


 


 


Arterial Blood Base Excess 6.7   


 


Stuart Test Positive   


 


Prothrombin Time


  


  17.9 SEC


(9.30-11.50)  H


 


Prothromb Time International


Ratio 


  1.7 (0.9-1.1)


H


 


Sodium Level


  


  137 MMOL/L


(136-145)


 


Potassium Level


  


  3.0 MMOL/L


(3.5-5.1)  L


 


Chloride Level


  


  97 MMOL/L


()  L


 


Carbon Dioxide Level


  


  31 MMOL/L


(21-32)


 


Anion Gap


  


  9 mmol/L


(5-15)


 


Blood Urea Nitrogen


  


  41 mg/dL


(7-18)  H


 


Creatinine


  


  1.4 MG/DL


(0.55-1.30)  H


 


Estimat Glomerular Filtration


Rate 


   mL/min (>60)  


 


 


Glucose Level


  


  182 MG/DL


()  H


 


Calcium Level


  


  8.4 MG/DL


(8.5-10.1)  L


 


Total Bilirubin


  


  0.6 MG/DL


(0.2-1.0)


 


Aspartate Amino Transf


(AST/SGOT) 


  27 U/L (15-37)


 


 


Alanine Aminotransferase


(ALT/SGPT) 


  16 U/L (12-78)


 


 


Alkaline Phosphatase


  


  67 U/L


()


 


Total Protein


  


  6.6 G/DL


(6.4-8.2)


 


Albumin


  


  2.6 G/DL


(3.4-5.0)  L


 


Globulin  4.0 g/dL  


 


Albumin/Globulin Ratio


  


  0.6 (1.0-2.7)


L











Current Medications








 Medications


  (Trade)  Dose


 Ordered  Sig/Mitra


 Route


 PRN Reason  Start Time


 Stop Time Status Last Admin


Dose Admin


 


 Acetaminophen


  (Tylenol)  650 mg  Q6H  PRN


 ORAL


 Mild Pain (Pain Scale 1-3)  6/25/18 08:15


 7/25/18 08:14   


 


 


 Albuterol/


 Ipratropium


  (Albuterol/


 Ipratropium)  3 ml  Q4HRT


 HHN


   6/25/18 11:00


 6/30/18 10:59  6/28/18 08:00


 


 


 Ascorbic Acid


  (Vitamin C)  500 mg  DAILY


 ORAL


   6/25/18 09:00


 7/25/18 08:59   


 


 


 Aspirin


  (ASA)  81 mg  DAILY


 ORAL


   6/26/18 09:00


 7/26/18 08:59   


 


 


 Atorvastatin


 Calcium


  (Lipitor)  10 mg  BEDTIME


 ORAL


   6/27/18 21:00


 7/27/18 20:59   


 


 


 Bisacodyl


  (Dulcolax)  10 mg  DAILYPRN  PRN


 RECTAL


 Constipation  6/25/18 08:15


 7/25/18 08:14   


 


 


 Calcium Carbonate


  (Os-Osmin)  1,250 mg  BIDPC


 ORAL


   6/25/18 09:00


 7/25/18 08:59   


 


 


 Carbidopa/Levodopa


  (Sinemet 25/100)  1 tab  Q4HR


 ORAL


   6/25/18 09:00


 7/25/18 08:59  6/28/18 00:12


 


 


 Carvedilol


  (Coreg)  6.25 mg  EVERY 12  HOURS


 ORAL


   6/27/18 09:00


 7/27/18 08:59   


 


 


 Clonidine HCl


  (Catapres Tab)  0.1 mg  Q4H  PRN


 ORAL


 SBP greater than 160  6/25/18 09:00


 7/25/18 08:59   


 


 


 Dextrose  1,000 ml @ 


 75 mls/hr  [id number]


 IV


   6/26/18 09:00


 7/26/18 08:59  6/28/18 00:14


 


 


 Dextrose


  (Dextrose 50%)  25 ml  STAT  PRN


 IV


 Hypoglycemia  6/25/18 08:15


 7/25/18 08:14   


 


 


 Dextrose


  (Dextrose 50%)  50 ml  STAT  PRN


 IV


 Hypoglycemia  6/25/18 08:15


 7/25/18 08:14   


 


 


 Digoxin


  (Lanoxin)  0.125 mg  DAILY


 ORAL


   6/25/18 09:00


 7/25/18 08:59   


 


 


 Docusate Sodium


  (Colace)  100 mg  TWICE A  DAY


 ORAL


   6/25/18 09:00


 7/25/18 08:59   


 


 


 Dorzolamide HCl


  (Trusopt)  1 drop  TWICE A  DAY


 RIGHT EYE


   6/25/18 10:00


 7/25/18 09:59  6/27/18 17:17


 


 


 Ferrous Sulfate


  (Feosol)  325 mg  THREE TIMES A  DAY


 ORAL


   6/25/18 09:00


 7/25/18 08:59  6/26/18 12:04


 


 


 Finasteride


  (Proscar)  5 mg  DAILY


 ORAL


   6/25/18 09:00


 7/25/18 08:59   


 


 


 Folic Acid


  (Folate)  1 mg  DAILY


 ORAL


   6/25/18 09:00


 7/25/18 08:59   


 


 


 Insulin Aspart


  (NovoLOG)    BEFORE MEALS AND  HS


 SUBQ


   6/25/18 11:30


 7/25/18 11:29  6/28/18 06:01


 


 


 Insulin Detemir


  (Levemir)  8 units  BID


 SUBQ


   6/25/18 09:00


 7/25/18 08:59  6/27/18 18:18


 


 


 Latanoprost


  (Xalatan)  1 drop  BEDTIME


 BOTH EYES


   6/25/18 21:00


 7/25/18 20:59  6/27/18 20:59


 


 


 Linezolid  300 ml @ 


 300 mls/hr  Q12HR@0400,1600


 IVPB


   6/26/18 16:00


 7/3/18 15:59  6/28/18 03:13


 


 


 Magnesium


 Hydroxide


  (Mom)  30 ml  DAILY


 ORAL


   6/25/18 09:00


 7/25/18 08:59   


 


 


 Memantine


  (Namenda)  10 mg  TWICE A  DAY


 ORAL


   6/25/18 09:00


 7/25/18 08:59   


 


 


 Multivitamins


  (Multivitamins)  1 tab  DAILY


 ORAL


   6/25/18 09:00


 7/25/18 08:59   


 


 


 Pantoprazole


  (Protonix)  40 mg  DAILY


 ORAL


   6/25/18 09:00


 7/25/18 08:59   


 


 


 Piperacillin Sod/


 Tazobactam Sod


 3.375 gm/Dextrose  110 ml @ 


 27.5 mls/hr  EVERY 8  HOURS


 IVPB


   6/25/18 14:00


 6/30/18 13:59  6/28/18 05:55


 


 


 Potassium Chloride  100 ml @ 


 100 mls/hr  Q1HR


 IVPB


   6/28/18 10:00


 6/28/18 12:59   


 


 


 Sennosides


  (Senokot)  1 tab  BEDTIME


 ORAL


   6/25/18 21:00


 7/25/18 20:59   


 


 


 Tamsulosin HCl


  (Flomax)  0.4 mg  BEDTIME


 ORAL


   6/25/18 21:00


 7/25/18 20:59   


 


 


 Vitamin B Complex


  (Vitamin B


 Complex)  1 tab  BEDTIME


 ORAL


   6/25/18 21:00


 7/25/18 20:59   


 


 


 Vitamin D


  (Vitamin D)  1,000 intlu  DAILY


 ORAL


   6/25/18 09:00


 7/25/18 08:59   


 


 


 Warfarin Sodium


  (Coumadin per


 pharmacy)  1 ea  DAILY  PRN


 MISC


 Per rx protocol  6/25/18 08:30


 7/25/18 08:29   


 

















Yahir Chapa MD Jun 28, 2018 09:28

## 2018-06-29 VITALS — SYSTOLIC BLOOD PRESSURE: 112 MMHG | DIASTOLIC BLOOD PRESSURE: 59 MMHG

## 2018-06-29 VITALS — SYSTOLIC BLOOD PRESSURE: 120 MMHG | DIASTOLIC BLOOD PRESSURE: 60 MMHG

## 2018-06-29 VITALS — SYSTOLIC BLOOD PRESSURE: 107 MMHG | DIASTOLIC BLOOD PRESSURE: 57 MMHG

## 2018-06-29 VITALS — SYSTOLIC BLOOD PRESSURE: 108 MMHG | DIASTOLIC BLOOD PRESSURE: 60 MMHG

## 2018-06-29 VITALS — DIASTOLIC BLOOD PRESSURE: 60 MMHG | SYSTOLIC BLOOD PRESSURE: 98 MMHG

## 2018-06-29 VITALS — SYSTOLIC BLOOD PRESSURE: 108 MMHG | DIASTOLIC BLOOD PRESSURE: 62 MMHG

## 2018-06-29 VITALS — DIASTOLIC BLOOD PRESSURE: 63 MMHG | SYSTOLIC BLOOD PRESSURE: 119 MMHG

## 2018-06-29 LAB
ALBUMIN SERPL-MCNC: 2.8 G/DL (ref 3.4–5)
ALBUMIN/GLOB SERPL: 0.7 {RATIO} (ref 1–2.7)
ALP SERPL-CCNC: 72 U/L (ref 46–116)
ALT SERPL-CCNC: 13 U/L (ref 12–78)
ANION GAP SERPL CALC-SCNC: 8 MMOL/L (ref 5–15)
AST SERPL-CCNC: 30 U/L (ref 15–37)
BILIRUB SERPL-MCNC: 0.6 MG/DL (ref 0.2–1)
BUN SERPL-MCNC: 30 MG/DL (ref 7–18)
CALCIUM SERPL-MCNC: 8.4 MG/DL (ref 8.5–10.1)
CHLORIDE SERPL-SCNC: 96 MMOL/L (ref 98–107)
CO2 SERPL-SCNC: 30 MMOL/L (ref 21–32)
CREAT SERPL-MCNC: 1.3 MG/DL (ref 0.55–1.3)
GLOBULIN SER-MCNC: 4 G/DL
INR PPP: 2 (ref 0.9–1.1)
POTASSIUM SERPL-SCNC: 3.3 MMOL/L (ref 3.5–5.1)
SODIUM SERPL-SCNC: 134 MMOL/L (ref 136–145)

## 2018-06-29 RX ADMIN — CARBIDOPA AND LEVODOPA SCH TAB: 25; 100 TABLET ORAL at 04:34

## 2018-06-29 RX ADMIN — INSULIN DETEMIR SCH UNITS: 100 INJECTION, SOLUTION SUBCUTANEOUS at 18:00

## 2018-06-29 RX ADMIN — INSULIN ASPART SCH UNITS: 100 INJECTION, SOLUTION INTRAVENOUS; SUBCUTANEOUS at 16:30

## 2018-06-29 RX ADMIN — CARBIDOPA AND LEVODOPA SCH TAB: 25; 100 TABLET ORAL at 22:38

## 2018-06-29 RX ADMIN — CARBIDOPA AND LEVODOPA SCH TAB: 25; 100 TABLET ORAL at 17:45

## 2018-06-29 RX ADMIN — IPRATROPIUM BROMIDE AND ALBUTEROL SULFATE SCH ML: .5; 3 SOLUTION RESPIRATORY (INHALATION) at 10:41

## 2018-06-29 RX ADMIN — PANTOPRAZOLE SODIUM SCH MG: 40 INJECTION, POWDER, FOR SOLUTION INTRAVENOUS at 14:40

## 2018-06-29 RX ADMIN — TAMSULOSIN HYDROCHLORIDE SCH MG: 0.4 CAPSULE ORAL at 22:38

## 2018-06-29 RX ADMIN — CARBIDOPA AND LEVODOPA SCH TAB: 25; 100 TABLET ORAL at 13:16

## 2018-06-29 RX ADMIN — IPRATROPIUM BROMIDE AND ALBUTEROL SULFATE SCH ML: .5; 3 SOLUTION RESPIRATORY (INHALATION) at 20:16

## 2018-06-29 RX ADMIN — INSULIN DETEMIR SCH UNITS: 100 INJECTION, SOLUTION SUBCUTANEOUS at 11:50

## 2018-06-29 RX ADMIN — MEMANTINE HYDROCHLORIDE SCH MG: 10 TABLET ORAL at 09:52

## 2018-06-29 RX ADMIN — DEXTROSE MONOHYDRATE SCH MLS/HR: 50 INJECTION, SOLUTION INTRAVENOUS at 06:27

## 2018-06-29 RX ADMIN — MEMANTINE HYDROCHLORIDE SCH MG: 10 TABLET ORAL at 17:46

## 2018-06-29 RX ADMIN — Medication SCH TAB: at 21:00

## 2018-06-29 RX ADMIN — INSULIN ASPART SCH UNITS: 100 INJECTION, SOLUTION INTRAVENOUS; SUBCUTANEOUS at 21:00

## 2018-06-29 RX ADMIN — ASPIRIN 81 MG SCH MG: 81 TABLET ORAL at 09:52

## 2018-06-29 RX ADMIN — DEXTROSE MONOHYDRATE SCH MLS/HR: 50 INJECTION, SOLUTION INTRAVENOUS at 14:41

## 2018-06-29 RX ADMIN — VITAMIN D, TAB 1000IU (100/BT) SCH INTLU: 25 TAB at 09:52

## 2018-06-29 RX ADMIN — CARVEDILOL SCH MG: 6.25 TABLET, FILM COATED ORAL at 22:48

## 2018-06-29 RX ADMIN — DORZOLAMIDE HYDROCHLORIDE SCH DROP: 20 SOLUTION/ DROPS OPHTHALMIC at 14:34

## 2018-06-29 RX ADMIN — MAGNESIUM HYDROXIDE SCH ML: 400 SUSPENSION ORAL at 09:50

## 2018-06-29 RX ADMIN — IPRATROPIUM BROMIDE AND ALBUTEROL SULFATE SCH ML: .5; 3 SOLUTION RESPIRATORY (INHALATION) at 03:02

## 2018-06-29 RX ADMIN — Medication SCH TAB: at 22:39

## 2018-06-29 RX ADMIN — DIGOXIN SCH MG: 0.12 TABLET ORAL at 09:54

## 2018-06-29 RX ADMIN — DOCUSATE SODIUM SCH MG: 100 CAPSULE, LIQUID FILLED ORAL at 09:52

## 2018-06-29 RX ADMIN — CARBIDOPA AND LEVODOPA SCH TAB: 25; 100 TABLET ORAL at 09:59

## 2018-06-29 RX ADMIN — CARVEDILOL SCH MG: 6.25 TABLET, FILM COATED ORAL at 09:53

## 2018-06-29 RX ADMIN — DOCUSATE SODIUM SCH MG: 100 CAPSULE, LIQUID FILLED ORAL at 17:46

## 2018-06-29 RX ADMIN — INSULIN ASPART SCH UNITS: 100 INJECTION, SOLUTION INTRAVENOUS; SUBCUTANEOUS at 06:27

## 2018-06-29 RX ADMIN — DEXTROSE MONOHYDRATE SCH MLS/HR: 50 INJECTION, SOLUTION INTRAVENOUS at 22:00

## 2018-06-29 RX ADMIN — IPRATROPIUM BROMIDE AND ALBUTEROL SULFATE SCH ML: .5; 3 SOLUTION RESPIRATORY (INHALATION) at 15:00

## 2018-06-29 RX ADMIN — INSULIN ASPART SCH UNITS: 100 INJECTION, SOLUTION INTRAVENOUS; SUBCUTANEOUS at 11:49

## 2018-06-29 RX ADMIN — CARBIDOPA AND LEVODOPA SCH TAB: 25; 100 TABLET ORAL at 01:00

## 2018-06-29 RX ADMIN — Medication SCH MG: at 09:53

## 2018-06-29 RX ADMIN — DORZOLAMIDE HYDROCHLORIDE SCH DROP: 20 SOLUTION/ DROPS OPHTHALMIC at 18:25

## 2018-06-29 RX ADMIN — LATANOPROST SCH DROP: 50 SOLUTION OPHTHALMIC at 21:00

## 2018-06-29 NOTE — GENERAL PROGRESS NOTE
Assessment/Plan


Problem List:  


(1) Anemia


ICD Codes:  D64.9 - Anemia, unspecified


SNOMED:  158000116


Qualifiers:  


   Qualified Codes:  D64.9 - Anemia, unspecified


(2) CKD (chronic kidney disease)


ICD Codes:  N18.9 - Chronic kidney disease, unspecified


SNOMED:  838759274


Qualifiers:  


   Qualified Codes:  N18.9 - Chronic kidney disease, unspecified


(3) Toxic metabolic encephalopathy


ICD Codes:  G92 - Toxic encephalopathy


SNOMED:  321439462


(4) CHF (congestive heart failure)


ICD Codes:  I50.9 - Heart failure, unspecified


SNOMED:  91091504


Qualifiers:  


   Qualified Codes:  I50.9 - Heart failure, unspecified


(5) UTI (urinary tract infection)


ICD Codes:  N39.0 - Urinary tract infection, site not specified


SNOMED:  08691516


Qualifiers:  


   Qualified Codes:  N30.00 - Acute cystitis without hematuria


Status:  stable, progressing


Assessment/Plan


iv abx


swallow eval/therapy


no gt/ngt per conservator


follow up cultures


dc ivf


lasix x 1


check cxr


replace lytes


prn diuresis


monitor fluid status





Subjective


ROS Limited/Unobtainable:  No


Constitutional:  Reports: malaise, weakness


HEENT:  Reports: no symptoms


Cardiovascular:  Reports: no symptoms


Respiratory:  Reports: cough, shortness of breath


Gastrointestinal/Abdominal:  Reports: difficulty swallowing


Genitourinary:  Reports: no symptoms


Neurologic/Psychiatric:  Reports: pre-existing deficit


Endocrine:  Reports: no symptoms


Hematologic/Lymphatic:  Reports: anemia


Allergies:  


Coded Allergies:  


     APIXABAN (Unverified  Allergy, Unknown, 6/25/18)


     BUMETANIDE (Unverified  Allergy, Unknown, 6/25/18)


     SPIRONOLACTONE (Unverified  Allergy, Unknown, 6/25/18)


All Systems:  reviewed and negative except above


Subjective


no events. w/o complaints. off bipap. feels better. labs reviewed. cards/id 

noted. 


difficulty swallowing. not hungry. off ivf. npa higher. able to eat breakfast





Objective





Last 24 Hour Vital Signs








  Date Time  Temp Pulse Resp B/P (MAP) Pulse Ox O2 Delivery O2 Flow Rate FiO2


 


6/29/18 07:50      Nasal Cannula 2.0 28


 


6/29/18 07:50     96 Nasal Cannula 2.0 28


 


6/29/18 04:27  81      


 


6/29/18 04:00 97.9 80 24 107/57 100 Nasal Cannula 2.0 





 97.9       


 


6/29/18 03:12  75 18  99 Nasal Cannula 2.0 28 6/29/18 03:02  72 18  98 Nasal Cannula 2.0 28 6/29/18 00:00 98.1 75 24 119/63 100 Nasal Cannula 2.0 





 98.1       


 


6/28/18 23:56  84      


 


6/28/18 22:37  75 18  99 Nasal Cannula 2.0 28 6/28/18 22:27  74 18  97 Nasal Cannula 2.0 28 6/28/18 21:00  75  108/55    


 


6/28/18 20:00 97.6 75 24 108/55 97 Nasal Cannula 2.0 





 97.6       


 


6/28/18 19:11  81      


 


6/28/18 19:11  76 20  98 Nasal Cannula 2.0 28 6/28/18 19:01      Nasal Cannula 2.0 28 6/28/18 19:01     95 Nasal Cannula 2.0 28 6/28/18 19:01  75 20  95 Nasal Cannula 2.0 28 6/28/18 16:49 97.6 76 18 103/50 100 Nasal Cannula 2.0 





 97.6       


 


6/28/18 16:00  79      


 


6/28/18 14:52  58 20  99 Nasal Cannula 2.0 28 6/28/18 14:43  52 20  97 Nasal Cannula 2.0 28 6/28/18 12:00 97.5 80 17 102/56 95 Nasal Cannula 2.0 





 97.5       


 


6/28/18 12:00  75      


 


6/28/18 11:00  42 20  99 Nasal Cannula 2.0 28 6/28/18 10:49  40 20  99 Nasal Cannula 2.0 28

















Intake and Output  


 


 6/28/18 6/29/18





 19:00 07:00


 


Intake Total 1030.0 ml 110.00 ml


 


Output Total 600 ml 800 ml


 


Balance 430.0 ml -690.00 ml


 


  


 


IV Total 1030.0 ml 110.00 ml


 


Output Urine Total 600 ml 800 ml


 


# Bowel Movements 2 2








Laboratory Tests


6/29/18 04:30: 


Sodium Level 134L, Potassium Level 3.3L, Chloride Level 96L, Carbon Dioxide 

Level 30, Anion Gap 8, Blood Urea Nitrogen 30H, Creatinine 1.3, Estimat 

Glomerular Filtration Rate , Glucose Level 45#L, Calcium Level 8.4L, Magnesium 

Level 2.1, Total Bilirubin 0.6, Aspartate Amino Transf (AST/SGOT) 30, Alanine 

Aminotransferase (ALT/SGPT) 13, Alkaline Phosphatase 72, Pro-B-Type Natriuretic 

Peptide 46122R, Total Protein 6.8, Albumin 2.8L, Globulin 4.0, Albumin/Globulin 

Ratio 0.7L


Height (Feet):  6


Height (Inches):  0.00


Weight (Pounds):  152


Objective


General Appearance:  WD/WN, alert


Neck:  supple


Cardiovascular:  regular rhythm


Respiratory/Chest:  chest wall non-tender, lungs clear, normal breath sounds, 

no respiratory distress


Abdomen:  normal bowel sounds, non tender, soft, no organomegaly


Edema:  no edema noted Arm (L), no edema noted Arm (R), no edema noted Leg (L), 

no edema noted Leg (R), no edema noted Pedal (L), no edema noted Pedal (R), no 

edema noted Generalized











Francisco Gomes MD Jun 29, 2018 09:16

## 2018-06-29 NOTE — UROLOGY PROGRESS NOTE
Assessment/Plan


Assessment/Plan


1. Pyuria and probable urinary tract infection.


2. Proteinuria.


3. Benign prostatic hypertrophy history.


4. Urinary retention.


5. Neurogenic bladder.


6. Chronic renal insufficiency, improved.





leonard indwelling


hand irrigated and patent


secured to pt's leg


abx as ordered


flomax and proscar


voiding trial?


cysto later


renal fxn has improved


d/w Dr. Gomes





Subjective


Allergies:  


Coded Allergies:  


     APIXABAN (Unverified  Allergy, Unknown, 6/25/18)


     BUMETANIDE (Unverified  Allergy, Unknown, 6/25/18)


     SPIRONOLACTONE (Unverified  Allergy, Unknown, 6/25/18)


Subjective


non-verbal





Objective





Last 24 Hour Vital Signs








  Date Time  Temp Pulse Resp B/P (MAP) Pulse Ox O2 Delivery O2 Flow Rate FiO2


 


6/29/18 07:50      Nasal Cannula 2.0 28 6/29/18 07:50     96 Nasal Cannula 2.0 28 6/29/18 04:27  81      


 


6/29/18 04:00 97.9 80 24 107/57 100 Nasal Cannula 2.0 





 97.9       


 


6/29/18 03:12  75 18  99 Nasal Cannula 2.0 28 6/29/18 03:02  72 18  98 Nasal Cannula 2.0 28 6/29/18 00:00 98.1 75 24 119/63 100 Nasal Cannula 2.0 





 98.1       


 


6/28/18 23:56  84      


 


6/28/18 22:37  75 18  99 Nasal Cannula 2.0 28 6/28/18 22:27  74 18  97 Nasal Cannula 2.0 28 6/28/18 21:00  75  108/55    


 


6/28/18 20:00 97.6 75 24 108/55 97 Nasal Cannula 2.0 





 97.6       


 


6/28/18 19:11  81      


 


6/28/18 19:11  76 20  98 Nasal Cannula 2.0 28 6/28/18 19:01      Nasal Cannula 2.0 28 6/28/18 19:01     95 Nasal Cannula 2.0 28 6/28/18 19:01  75 20  95 Nasal Cannula 2.0 28 6/28/18 16:49 97.6 76 18 103/50 100 Nasal Cannula 2.0 





 97.6       


 


6/28/18 16:00  79      


 


6/28/18 14:52  58 20  99 Nasal Cannula 2.0 28 6/28/18 14:43  52 20  97 Nasal Cannula 2.0 28


 


6/28/18 12:00 97.5 80 17 102/56 95 Nasal Cannula 2.0 





 97.5       


 


6/28/18 12:00  75      


 


6/28/18 11:00  42 20  99 Nasal Cannula 2.0 28


 


6/28/18 10:49  40 20  99 Nasal Cannula 2.0 28


 


6/28/18 09:00  70  102/59    


 


6/28/18 09:00  70      


 


6/28/18 08:28 97.5 70 22 102/59 99 Nasal Cannula 2.0 





 97.5       

















Intake and Output  


 


 6/28/18 6/29/18





 19:00 07:00


 


Intake Total 1030.0 ml 110.00 ml


 


Output Total 600 ml 800 ml


 


Balance 430.0 ml -690.00 ml


 


  


 


IV Total 1030.0 ml 110.00 ml


 


Output Urine Total 600 ml 800 ml


 


# Bowel Movements 2 2











Microbiology








 Date/Time


Source Procedure


Growth Status


 


 


 6/25/18 01:07


Blood Blood Culture - Final


Staphylococcus Sp Coag Neg Complete


 


 6/25/18 01:07


Nasal Nares MRSA Culture - Final


Staphylococcus Aureus - Mrsa Complete


 


 6/25/18 01:07


Urine,Clean Catch Urine Culture - Final


NO GROWTH AFTER 48 HOURS Complete





 6/25/18 01:07


Rectum VRE Culture - Final


Enterococcus Faecalis - Vre Complete


 


 6/25/18 01:07


Rectum - Final


NO CARBAPENEM-RESISTANT ENTEROBACTERI... Complete








Current Medications








 Medications


  (Trade)  Dose


 Ordered  Sig/Mitra


 Route


 PRN Reason  Start Time


 Stop Time Status Last Admin


Dose Admin


 


 Acetaminophen


  (Tylenol)  650 mg  Q6H  PRN


 ORAL


 Mild Pain (Pain Scale 1-3)  6/29/18 08:15


 7/25/18 08:14 UNV  


 


 


 Albuterol/


 Ipratropium


  (Albuterol/


 Ipratropium)  3 ml  Q4HRT


 HHN


   6/29/18 07:00


 6/30/18 10:59 UNV  


 


 


 Ascorbic Acid


  (Vitamin C)  500 mg  DAILY


 ORAL


   6/29/18 09:00


 7/25/18 08:59 UNV  


 


 


 Aspirin


  (ASA)  81 mg  DAILY


 ORAL


   6/29/18 09:00


 7/26/18 08:59 UNV  


 


 


 Atorvastatin


 Calcium


  (Lipitor)  10 mg  BEDTIME


 ORAL


   6/29/18 21:00


 7/27/18 20:59 UNV  


 


 


 Bisacodyl


  (Dulcolax)  10 mg  DAILYPRN  PRN


 RECTAL


 Constipation  6/29/18 08:15


 7/25/18 08:14 UNV  


 


 


 Calcium Carbonate


  (Os-Osmin)  1,250 mg  BIDPC


 ORAL


   6/29/18 09:00


 7/25/18 08:59 UNV  


 


 


 Carbidopa/Levodopa


  (Sinemet 25/100)  1 tab  Q4HR


 ORAL


   6/29/18 09:00


 7/25/18 08:59 UNV  


 


 


 Carvedilol


  (Coreg)  6.25 mg  EVERY 12  HOURS


 ORAL


   6/29/18 09:00


 7/27/18 08:59 UNV  


 


 


 Clonidine HCl


  (Catapres Tab)  0.1 mg  Q4H  PRN


 ORAL


 SBP greater than 160  6/29/18 09:00


 7/25/18 08:59 UNV  


 


 


 Dextrose


  (Dextrose 50%)  25 ml  STAT  PRN


 IV


 Hypoglycemia  6/29/18 08:15


 7/25/18 08:14 UNV  


 


 


 Dextrose


  (Dextrose 50%)  50 ml  STAT  PRN


 IV


 Hypoglycemia  6/29/18 08:15


 7/25/18 08:14 UNV  


 


 


 Digoxin


  (Lanoxin)  0.125 mg  DAILY


 ORAL


   6/29/18 09:00


 7/25/18 08:59 UNV  


 


 


 Docusate Sodium


  (Colace)  100 mg  TWICE A  DAY


 ORAL


   6/29/18 09:00


 7/25/18 08:59 UNV  


 


 


 Dorzolamide HCl


  (Trusopt)  1 drop  TWICE A  DAY


 RIGHT EYE


   6/29/18 09:00


 7/25/18 09:59 UNV  


 


 


 Ferrous Sulfate


  (Feosol)  325 mg  THREE TIMES A  DAY


 ORAL


   6/29/18 09:00


 7/25/18 08:59 UNV  


 


 


 Finasteride


  (Proscar)  5 mg  DAILY


 ORAL


   6/29/18 09:00


 7/25/18 08:59 UNV  


 


 


 Folic Acid


  (Folate)  1 mg  DAILY


 ORAL


   6/29/18 09:00


 7/25/18 08:59 UNV  


 


 


 Insulin Aspart


  (NovoLOG)    BEFORE MEALS AND  HS


 SUBQ


   6/29/18 11:30


 7/25/18 11:29 UNV  


 


 


 Insulin Detemir


  (Levemir)  8 units  BID


 SUBQ


   6/29/18 09:00


 7/25/18 08:59 UNV  


 


 


 Latanoprost


  (Xalatan)  1 drop  BEDTIME


 BOTH EYES


   6/29/18 21:00


 7/25/18 20:59 UNV  


 


 


 Magnesium


 Hydroxide


  (Mom)  30 ml  DAILY


 ORAL


   6/29/18 09:00


 7/25/18 08:59 UNV  


 


 


 Memantine


  (Namenda)  10 mg  TWICE A  DAY


 ORAL


   6/29/18 09:00


 7/25/18 08:59 UNV  


 


 


 Multivitamins


  (Multivitamins)  1 tab  DAILY


 ORAL


   6/29/18 09:00


 7/25/18 08:59 UNV  


 


 


 Pantoprazole


  (Protonix)  40 mg  DAILY


 ORAL


   6/29/18 09:00


 7/25/18 08:59 UNV  


 


 


 Piperacillin Sod/


 Tazobactam Sod


 3.375 gm/Dextrose  110 ml @ 


 27.5 mls/hr  EVERY 8  HOURS


 IVPB


   6/29/18 14:00


 6/30/18 13:59 UNV  


 


 


 Sennosides


  (Senokot)  1 tab  BEDTIME


 ORAL


   6/29/18 21:00


 7/25/18 20:59 UNV  


 


 


 Tamsulosin HCl


  (Flomax)  0.4 mg  BEDTIME


 ORAL


   6/29/18 21:00


 7/25/18 20:59 UNV  


 


 


 Vitamin B Complex


  (Vitamin B


 Complex)  1 tab  BEDTIME


 ORAL


   6/29/18 21:00


 7/25/18 20:59 UNV  


 


 


 Vitamin D


  (Vitamin D)  1,000 intlu  DAILY


 ORAL


   6/29/18 09:00


 7/25/18 08:59 UNV  


 


 


 Warfarin Sodium


  (Coumadin per


 pharmacy)  1 ea  DAILY  PRN


 MISC


 Per rx protocol  6/29/18 09:00


 7/25/18 08:29 UNV  


 





Laboratory Tests


6/29/18 04:30: 


Sodium Level 134L, Potassium Level 3.3L, Chloride Level 96L, Carbon Dioxide 

Level 30, Anion Gap 8, Blood Urea Nitrogen 30H, Creatinine 1.3, Estimat 

Glomerular Filtration Rate , Glucose Level 45#L, Calcium Level 8.4L, Magnesium 

Level 2.1, Total Bilirubin 0.6, Aspartate Amino Transf (AST/SGOT) 30, Alanine 

Aminotransferase (ALT/SGPT) 13, Alkaline Phosphatase 72, Pro-B-Type Natriuretic 

Peptide 54564R, Total Protein 6.8, Albumin 2.8L, Globulin 4.0, Albumin/Globulin 

Ratio 0.7L


Height (Feet):  6


Height (Inches):  0.00


Weight (Pounds):  152


Objective


 exam stable, leonard indwelling, urine grossly yellow, mild debris











USRENDRA SHEFFIELD Jun 29, 2018 08:19

## 2018-06-29 NOTE — DIAGNOSTIC IMAGING REPORT
Indication: Cough

 

Technique: One view of the chest

 

Comparison: 6/25/2018

 

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 Atelectatic changes or

scarring are seen in the bilateral perihilar regions and lung bases, slightly

increased at the right lung base. No focal airspace consolidation. The heart remains

enlarged. Median sternotomy sutures are again demonstrated

 

Impression: Increased right basilar atelectasis

 

Otherwise stable findings as described over 4 days

## 2018-06-29 NOTE — INFECTIOUS DISEASES PROG NOTE
Assessment/Plan


Assessment/Plan


A


1. UTI


2. renal failure improving


3. COPD


4. CHF


5. diabetes


6. hypertension


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


8.MRSA colonization





P


1. continue Zosyn , 


2. will follow up cultures





Subjective


ROS Limited/Unobtainable:  Yes


Allergies:  


Coded Allergies:  


     APIXABAN (Unverified  Allergy, Unknown, 6/25/18)


     BUMETANIDE (Unverified  Allergy, Unknown, 6/25/18)


     SPIRONOLACTONE (Unverified  Allergy, Unknown, 6/25/18)





Objective


Vital Signs





Last 24 Hour Vital Signs








  Date Time  Temp Pulse Resp B/P (MAP) Pulse Ox O2 Delivery O2 Flow Rate FiO2


 


6/29/18 12:00 98.1 73 18 108/60 97 Nasal Cannula 2.0 





 98.1       


 


6/29/18 10:52  59 18  99 Nasal Cannula 2.0 28 6/29/18 10:42  57 16  98 Nasal Cannula 2.0 28 6/29/18 09:54  66      


 


6/29/18 09:53  66  108/62    


 


6/29/18 08:00 98.4 66 18 108/62 100 Nasal Cannula 2.0 





 98.4       


 


6/29/18 07:50      Nasal Cannula 2.0 28


 


6/29/18 07:50     96 Nasal Cannula 2.0 28 6/29/18 04:27  81      


 


6/29/18 04:00 97.9 80 24 107/57 100 Nasal Cannula 2.0 





 97.9       


 


6/29/18 03:12  75 18  99 Nasal Cannula 2.0 28 6/29/18 03:02  72 18  98 Nasal Cannula 2.0 28 6/29/18 00:00 98.1 75 24 119/63 100 Nasal Cannula 2.0 





 98.1       


 


6/28/18 23:56  84      


 


6/28/18 22:37  75 18  99 Nasal Cannula 2.0 28


 


6/28/18 22:27  74 18  97 Nasal Cannula 2.0 28


 


6/28/18 21:00  75  108/55    


 


6/28/18 20:00 97.6 75 24 108/55 97 Nasal Cannula 2.0 





 97.6       


 


6/28/18 19:11  81      


 


6/28/18 19:11  76 20  98 Nasal Cannula 2.0 28


 


6/28/18 19:01      Nasal Cannula 2.0 28 6/28/18 19:01     95 Nasal Cannula 2.0 28


 


6/28/18 19:01  75 20  95 Nasal Cannula 2.0 28


 


6/28/18 16:49 97.6 76 18 103/50 100 Nasal Cannula 2.0 





 97.6       


 


6/28/18 16:00  79      


 


6/28/18 14:52  58 20  99 Nasal Cannula 2.0 28


 


6/28/18 14:43  52 20  97 Nasal Cannula 2.0 28








Height (Feet):  6


Height (Inches):  0.00


Weight (Pounds):  152


HEENT:  mucous membranes moist


Respiratory/Chest:  lungs clear


Cardiovascular:  normal rate, pacemaker/AICD


Abdomen:  soft, non tender


Extremities:  no edema


Neurologic/Psychiatric:  aphasia, other - AWAKE





Laboratory Tests








Test


  6/29/18


04:30 6/29/18


10:50


 


Sodium Level


  134 MMOL/L


(136-145)  L 


 


 


Potassium Level


  3.3 MMOL/L


(3.5-5.1)  L 


 


 


Chloride Level


  96 MMOL/L


()  L 


 


 


Carbon Dioxide Level


  30 MMOL/L


(21-32) 


 


 


Anion Gap


  8 mmol/L


(5-15) 


 


 


Blood Urea Nitrogen


  30 mg/dL


(7-18)  H 


 


 


Creatinine


  1.3 MG/DL


(0.55-1.30) 


 


 


Estimat Glomerular Filtration


Rate  mL/min (>60)  


  


 


 


Glucose Level


  45 MG/DL


()  #L 


 


 


Calcium Level


  8.4 MG/DL


(8.5-10.1)  L 


 


 


Magnesium Level


  2.1 MG/DL


(1.8-2.4) 


 


 


Total Bilirubin


  0.6 MG/DL


(0.2-1.0) 


 


 


Aspartate Amino Transf


(AST/SGOT) 30 U/L (15-37)


  


 


 


Alanine Aminotransferase


(ALT/SGPT) 13 U/L (12-78)


  


 


 


Alkaline Phosphatase


  72 U/L


() 


 


 


Pro-B-Type Natriuretic Peptide


  24635 pg/mL


(0-125)  H 


 


 


Total Protein


  6.8 G/DL


(6.4-8.2) 


 


 


Albumin


  2.8 G/DL


(3.4-5.0)  L 


 


 


Globulin 4.0 g/dL   


 


Albumin/Globulin Ratio


  0.7 (1.0-2.7)


L 


 


 


Prothrombin Time


  


  20.8 SEC


(9.30-11.50)  H


 


Prothromb Time International


Ratio 


  2.0 (0.9-1.1)


H











Current Medications








 Medications


  (Trade)  Dose


 Ordered  Sig/Mitra


 Route


 PRN Reason  Start Time


 Stop Time Status Last Admin


Dose Admin


 


 Acetaminophen


  (Tylenol)  650 mg  Q6H  PRN


 ORAL


 Mild Pain (Pain Scale 1-3)  6/29/18 08:30


 7/25/18 08:29   


 


 


 Albuterol/


 Ipratropium


  (Albuterol/


 Ipratropium)  3 ml  Q4HRT


 HHN


   6/29/18 11:00


 6/30/18 10:59  6/29/18 10:41


 


 


 Ascorbic Acid


  (Vitamin C)  500 mg  DAILY


 ORAL


   6/29/18 09:00


 7/25/18 08:59  6/29/18 09:53


 


 


 Aspirin


  (ASA)  81 mg  DAILY


 ORAL


   6/29/18 09:00


 7/26/18 08:59   


 


 


 Atorvastatin


 Calcium


  (Lipitor)  10 mg  BEDTIME


 ORAL


   6/29/18 21:00


 7/27/18 20:59   


 


 


 Bisacodyl


  (Dulcolax)  10 mg  DAILYPRN  PRN


 RECTAL


 Constipation  6/29/18 09:00


 7/25/18 08:59   


 


 


 Calcium Carbonate


  (Os-Osmin)  1,250 mg  BIDPC


 ORAL


   6/29/18 09:00


 7/25/18 08:59  6/29/18 09:51


 


 


 Carbidopa/Levodopa


  (Sinemet 25/100)  1 tab  Q4HR


 ORAL


   6/29/18 09:00


 7/25/18 08:59  6/29/18 13:16


 


 


 Carvedilol


  (Coreg)  6.25 mg  EVERY 12  HOURS


 ORAL


   6/29/18 09:00


 7/27/18 08:59  6/29/18 09:53


 


 


 Clonidine HCl


  (Catapres Tab)  0.1 mg  Q4H  PRN


 ORAL


 SBP greater than 160  6/29/18 09:00


 7/25/18 08:59   


 


 


 Dextrose


  (Dextrose 50%)  25 ml  STAT  PRN


 IV


 Hypoglycemia  6/29/18 09:00


 7/25/18 08:59   


 


 


 Dextrose


  (Dextrose 50%)  50 ml  STAT  PRN


 IV


 Hypoglycemia  6/29/18 09:00


 7/25/18 08:59   


 


 


 Digoxin


  (Lanoxin)  0.125 mg  DAILY


 ORAL


   6/29/18 09:00


 7/25/18 08:59  6/29/18 09:54


 


 


 Docusate Sodium


  (Colace)  100 mg  TWICE A  DAY


 ORAL


   6/29/18 09:00


 7/25/18 08:59  6/29/18 09:52


 


 


 Dorzolamide HCl


  (Trusopt)  1 drop  TWICE A  DAY


 RIGHT EYE


   6/29/18 09:00


 7/25/18 09:59   


 


 


 Ferrous Sulfate


  (Feosol)  325 mg  THREE TIMES A  DAY


 ORAL


   6/29/18 09:00


 7/25/18 08:59  6/29/18 13:16


 


 


 Finasteride


  (Proscar)  5 mg  DAILY


 ORAL


   6/29/18 09:00


 7/25/18 08:59  6/29/18 09:51


 


 


 Folic Acid


  (Folate)  1 mg  DAILY


 ORAL


   6/29/18 09:00


 7/25/18 08:59  6/29/18 09:53


 


 


 Insulin Aspart


  (NovoLOG)    BEFORE MEALS AND  HS


 SUBQ


   6/29/18 11:30


 7/25/18 11:29  6/29/18 11:49


 


 


 Insulin Detemir


  (Levemir)  8 units  BID


 SUBQ


   6/29/18 09:00


 7/25/18 08:59  6/29/18 11:50


 


 


 Latanoprost


  (Xalatan)  1 drop  BEDTIME


 BOTH EYES


   6/29/18 21:00


 7/25/18 20:59   


 


 


 Magnesium


 Hydroxide


  (Mom)  30 ml  DAILY


 ORAL


   6/29/18 09:00


 7/25/18 08:59   


 


 


 Memantine


  (Namenda)  10 mg  TWICE A  DAY


 ORAL


   6/29/18 09:00


 7/25/18 08:59  6/29/18 09:52


 


 


 Multivitamins


  (Multivitamins)  1 tab  DAILY


 ORAL


   6/29/18 09:00


 7/25/18 08:59  6/29/18 09:52


 


 


 Pantoprazole


  (Protonix)  40 mg  DAILY


 IVP


   6/29/18 14:00


 7/29/18 13:59   


 


 


 Piperacillin Sod/


 Tazobactam Sod


 3.375 gm/Dextrose  110 ml @ 


 27.5 mls/hr  EVERY 8  HOURS


 IVPB


   6/29/18 14:00


 6/30/18 13:59   


 


 


 Potassium Chloride  100 ml @ 


 100 mls/hr  Q1H


 IVPB


   6/29/18 14:00


 6/29/18 19:59   


 


 


 Sennosides


  (Senokot)  1 tab  BEDTIME


 ORAL


   6/29/18 21:00


 7/25/18 20:59   


 


 


 Tamsulosin HCl


  (Flomax)  0.4 mg  BEDTIME


 ORAL


   6/29/18 21:00


 7/25/18 20:59   


 


 


 Vitamin B Complex


  (Vitamin B


 Complex)  1 tab  BEDTIME


 ORAL


   6/29/18 21:00


 7/25/18 20:59   


 


 


 Vitamin D


  (Vitamin D)  1,000 intlu  DAILY


 ORAL


   6/29/18 09:00


 7/25/18 08:59  6/29/18 09:52


 


 


 Warfarin Sodium


  (Coumadin per


 pharmacy)  1 ea  DAILY  PRN


 MISC


 Per rx protocol  6/29/18 09:00


 7/25/18 08:29   


 

















Yahir Chapa MD Jun 29, 2018 13:36

## 2018-06-29 NOTE — PROGRESS NOTE
DATE:  06/28/2018



CARDIOLOGY PROGRESS NOTE



SUBJECTIVE:  The patient has no complaints, off BiPAP, feels better.  Poor

appetite noted.  Difficulty swallowing described.  He remains on IV

fluids.



OBJECTIVE:

VITAL SIGNS:  Blood pressure 103/50, pulse 76, and respiratory rate 18.

Monitored rhythm, atrial fibrillation with ventricular pacing.

LUNGS:  Coarse breath sounds.

HEART:  Regular rhythm and rate.  Normal S1, paradoxically split

S2.

ABDOMEN:  Soft.

EXTREMITIES:  Trace edema.



LABORATORY AND DIAGNOSTIC DATA:  Sodium 137, potassium 3.0, bicarbonate 31,

BUN 41, and creatinine 1.4.  Albumin 3.6.



IMPRESSION:

1. Acute on chronic diastolic congestive heart failure, improved.

2. Acute on chronic renal insufficiency, improved.

3. Hypokalemia.

4. Cardiac defibrillator.

5. Anemia of chronic kidney disease.

6. Paroxysmal atrial fibrillation.

7. Acute cystitis.

8. Dysphagia.



PLAN:

1. Antimicrobials.

2. Potassium replacement.

3. Swallow evaluation.

4. Hypotonic fluids to correct electrolyte abnormalities as needed.

5. Full anticoagulation for cardioembolic prophylaxis.









  ______________________________________________

  Henok Palma M.D.





DR:  JUSTICE

D:  06/28/2018 22:23

T:  06/29/2018 00:40

JOB#:  [id number]

CC:

## 2018-06-30 VITALS — DIASTOLIC BLOOD PRESSURE: 58 MMHG | SYSTOLIC BLOOD PRESSURE: 98 MMHG

## 2018-06-30 VITALS — DIASTOLIC BLOOD PRESSURE: 57 MMHG | SYSTOLIC BLOOD PRESSURE: 88 MMHG

## 2018-06-30 VITALS — SYSTOLIC BLOOD PRESSURE: 95 MMHG | DIASTOLIC BLOOD PRESSURE: 51 MMHG

## 2018-06-30 VITALS — SYSTOLIC BLOOD PRESSURE: 102 MMHG | DIASTOLIC BLOOD PRESSURE: 57 MMHG

## 2018-06-30 VITALS — DIASTOLIC BLOOD PRESSURE: 67 MMHG | SYSTOLIC BLOOD PRESSURE: 102 MMHG

## 2018-06-30 LAB
ALBUMIN SERPL-MCNC: 2.6 G/DL (ref 3.4–5)
ALBUMIN/GLOB SERPL: 0.6 {RATIO} (ref 1–2.7)
ALP SERPL-CCNC: 76 U/L (ref 46–116)
ALT SERPL-CCNC: 7 U/L (ref 12–78)
ANION GAP SERPL CALC-SCNC: 8 MMOL/L (ref 5–15)
AST SERPL-CCNC: 38 U/L (ref 15–37)
BILIRUB SERPL-MCNC: 0.7 MG/DL (ref 0.2–1)
BUN SERPL-MCNC: 24 MG/DL (ref 7–18)
CALCIUM SERPL-MCNC: 9.3 MG/DL (ref 8.5–10.1)
CHLORIDE SERPL-SCNC: 96 MMOL/L (ref 98–107)
CO2 SERPL-SCNC: 29 MMOL/L (ref 21–32)
CREAT SERPL-MCNC: 1.4 MG/DL (ref 0.55–1.3)
GLOBULIN SER-MCNC: 4 G/DL
INR PPP: 1.8 (ref 0.9–1.1)
POTASSIUM SERPL-SCNC: 3.7 MMOL/L (ref 3.5–5.1)
SODIUM SERPL-SCNC: 133 MMOL/L (ref 136–145)

## 2018-06-30 RX ADMIN — FUROSEMIDE SCH MG: 40 TABLET ORAL at 10:10

## 2018-06-30 RX ADMIN — IPRATROPIUM BROMIDE AND ALBUTEROL SULFATE SCH ML: .5; 3 SOLUTION RESPIRATORY (INHALATION) at 23:36

## 2018-06-30 RX ADMIN — IPRATROPIUM BROMIDE AND ALBUTEROL SULFATE SCH ML: .5; 3 SOLUTION RESPIRATORY (INHALATION) at 07:59

## 2018-06-30 RX ADMIN — INSULIN ASPART SCH UNITS: 100 INJECTION, SOLUTION INTRAVENOUS; SUBCUTANEOUS at 12:30

## 2018-06-30 RX ADMIN — VITAMIN D, TAB 1000IU (100/BT) SCH INTLU: 25 TAB at 10:09

## 2018-06-30 RX ADMIN — Medication SCH MG: at 10:10

## 2018-06-30 RX ADMIN — INSULIN ASPART SCH UNITS: 100 INJECTION, SOLUTION INTRAVENOUS; SUBCUTANEOUS at 06:27

## 2018-06-30 RX ADMIN — IPRATROPIUM BROMIDE AND ALBUTEROL SULFATE SCH ML: .5; 3 SOLUTION RESPIRATORY (INHALATION) at 01:00

## 2018-06-30 RX ADMIN — IPRATROPIUM BROMIDE AND ALBUTEROL SULFATE SCH ML: .5; 3 SOLUTION RESPIRATORY (INHALATION) at 20:07

## 2018-06-30 RX ADMIN — INSULIN DETEMIR SCH UNITS: 100 INJECTION, SOLUTION SUBCUTANEOUS at 10:19

## 2018-06-30 RX ADMIN — MEMANTINE HYDROCHLORIDE SCH MG: 10 TABLET ORAL at 17:46

## 2018-06-30 RX ADMIN — CARBIDOPA AND LEVODOPA SCH TAB: 25; 100 TABLET ORAL at 06:27

## 2018-06-30 RX ADMIN — INSULIN DETEMIR SCH UNITS: 100 INJECTION, SOLUTION SUBCUTANEOUS at 17:52

## 2018-06-30 RX ADMIN — DOCUSATE SODIUM SCH MG: 100 CAPSULE, LIQUID FILLED ORAL at 10:11

## 2018-06-30 RX ADMIN — DOCUSATE SODIUM SCH MG: 100 CAPSULE, LIQUID FILLED ORAL at 17:46

## 2018-06-30 RX ADMIN — ASPIRIN 81 MG SCH MG: 81 TABLET ORAL at 10:08

## 2018-06-30 RX ADMIN — CARVEDILOL SCH MG: 6.25 TABLET, FILM COATED ORAL at 21:00

## 2018-06-30 RX ADMIN — MEMANTINE HYDROCHLORIDE SCH MG: 10 TABLET ORAL at 10:10

## 2018-06-30 RX ADMIN — CARBIDOPA AND LEVODOPA SCH TAB: 25; 100 TABLET ORAL at 17:46

## 2018-06-30 RX ADMIN — TAMSULOSIN HYDROCHLORIDE SCH MG: 0.4 CAPSULE ORAL at 21:46

## 2018-06-30 RX ADMIN — LATANOPROST SCH DROP: 50 SOLUTION OPHTHALMIC at 21:00

## 2018-06-30 RX ADMIN — CARBIDOPA AND LEVODOPA SCH TAB: 25; 100 TABLET ORAL at 21:58

## 2018-06-30 RX ADMIN — IPRATROPIUM BROMIDE AND ALBUTEROL SULFATE SCH ML: .5; 3 SOLUTION RESPIRATORY (INHALATION) at 16:00

## 2018-06-30 RX ADMIN — DIGOXIN SCH MG: 0.12 TABLET ORAL at 10:09

## 2018-06-30 RX ADMIN — CARVEDILOL SCH MG: 6.25 TABLET, FILM COATED ORAL at 10:11

## 2018-06-30 RX ADMIN — CARBIDOPA AND LEVODOPA SCH TAB: 25; 100 TABLET ORAL at 14:00

## 2018-06-30 RX ADMIN — DORZOLAMIDE HYDROCHLORIDE SCH DROP: 20 SOLUTION/ DROPS OPHTHALMIC at 10:18

## 2018-06-30 RX ADMIN — CARBIDOPA AND LEVODOPA SCH TAB: 25; 100 TABLET ORAL at 01:29

## 2018-06-30 RX ADMIN — CARBIDOPA AND LEVODOPA SCH TAB: 25; 100 TABLET ORAL at 10:18

## 2018-06-30 RX ADMIN — PANTOPRAZOLE SODIUM SCH MG: 40 INJECTION, POWDER, FOR SOLUTION INTRAVENOUS at 10:11

## 2018-06-30 RX ADMIN — IPRATROPIUM BROMIDE AND ALBUTEROL SULFATE SCH ML: .5; 3 SOLUTION RESPIRATORY (INHALATION) at 03:32

## 2018-06-30 RX ADMIN — DEXTROSE MONOHYDRATE SCH MLS/HR: 50 INJECTION, SOLUTION INTRAVENOUS at 06:27

## 2018-06-30 RX ADMIN — Medication SCH TAB: at 21:00

## 2018-06-30 RX ADMIN — MAGNESIUM HYDROXIDE SCH ML: 400 SUSPENSION ORAL at 10:11

## 2018-06-30 RX ADMIN — INSULIN ASPART SCH UNITS: 100 INJECTION, SOLUTION INTRAVENOUS; SUBCUTANEOUS at 21:00

## 2018-06-30 RX ADMIN — INSULIN ASPART SCH UNITS: 100 INJECTION, SOLUTION INTRAVENOUS; SUBCUTANEOUS at 16:51

## 2018-06-30 RX ADMIN — Medication SCH TAB: at 21:46

## 2018-06-30 RX ADMIN — DORZOLAMIDE HYDROCHLORIDE SCH DROP: 20 SOLUTION/ DROPS OPHTHALMIC at 17:47

## 2018-06-30 NOTE — INFECTIOUS DISEASES PROG NOTE
Assessment/Plan


Assessment/Plan


antibiotics : zosyn





A


1. UTI s/p rx


2. renal failure improving


3. COPD


4. CHF


5. diabetes


6. hypertension


7. + blood cultures with coag neg staph likely contaminated


8. MRSA nasal colonization


9. rectal VRE colonization





P


1. d/c zosyn


2. observe off antibiotics





Subjective


ROS Limited/Unobtainable:  Yes


Allergies:  


Coded Allergies:  


     APIXABAN (Unverified  Allergy, Unknown, 6/25/18)


     BUMETANIDE (Unverified  Allergy, Unknown, 6/25/18)


     SPIRONOLACTONE (Unverified  Allergy, Unknown, 6/25/18)





Objective


Vital Signs





Last 24 Hour Vital Signs








  Date Time  Temp Pulse Resp B/P (MAP) Pulse Ox O2 Delivery O2 Flow Rate FiO2


 


6/30/18 10:11  82  102/67    


 


6/30/18 10:09  82      


 


6/30/18 08:20  82 20  96 Nasal Cannula 2.0 28


 


6/30/18 08:00 97.5 78 20 102/67 97   





 97.5       


 


6/30/18 07:59  76 16  92 Nasal Cannula 2.0 28


 


6/30/18 07:59     92 Nasal Cannula 2.0 28


 


6/30/18 07:59      Nasal Cannula 2.0 28


 


6/30/18 04:00 97.5 77 24 88/57 100   





 97.5       


 


6/30/18 04:00  75      


 


6/30/18 03:32  74 20  96 Nasal Cannula 2.0 28


 


6/30/18 03:32  71 16  95 Nasal Cannula 2.0 28


 


6/30/18 00:00  75      


 


6/30/18 00:00 97.3 75 22  98   





 97.3       


 


6/30/18 00:00  84 20  97 Nasal Cannula 2.0 28


 


6/30/18 00:00  74 16  96 Nasal Cannula 2.0 28


 


6/29/18 22:48  73  120/60    


 


6/29/18 22:46  73  120/60    


 


6/29/18 20:00  80 20  97 Nasal Cannula 2.0 28


 


6/29/18 20:00  78 18  94 Nasal Cannula 2.0 28


 


6/29/18 20:00  80      


 


6/29/18 20:00  85  98/60 99   


 


6/29/18 20:00     94 Nasal Cannula 2.0 28


 


6/29/18 20:00      Nasal Cannula 2.0 28


 


6/29/18 16:00 98.1 69 20 112/59 98 Nasal Cannula 2.0 





 98.1       


 


6/29/18 16:00  78      


 


6/29/18 15:10  77 16  99 Nasal Cannula 2.0 28


 


6/29/18 15:00  75 18  96 Nasal Cannula 2.0 28


 


6/29/18 12:00  80      


 


6/29/18 12:00 98.1 73 18 108/60 97 Nasal Cannula 2.0 





 98.1       








Height (Feet):  6


Height (Inches):  0.00


Weight (Pounds):  178


Respiratory/Chest:  lungs clear


Cardiovascular:  normal rate, regular rhythm, no gallop/murmur


Abdomen:  soft, non tender


Extremities:  no edema





Laboratory Tests








Test


  6/30/18


08:25


 


Prothrombin Time


  19.1 SEC


(9.30-11.50)  H


 


Prothromb Time International


Ratio 1.8 (0.9-1.1)


H


 


Sodium Level


  133 MMOL/L


(136-145)  L


 


Potassium Level


  3.7 MMOL/L


(3.5-5.1)


 


Chloride Level


  96 MMOL/L


()  L


 


Carbon Dioxide Level


  29 MMOL/L


(21-32)


 


Anion Gap


  8 mmol/L


(5-15)


 


Blood Urea Nitrogen


  24 mg/dL


(7-18)  H


 


Creatinine


  1.4 MG/DL


(0.55-1.30)  H


 


Estimat Glomerular Filtration


Rate  mL/min (>60)  


 


 


Glucose Level


  96 MG/DL


()


 


Calcium Level


  9.3 MG/DL


(8.5-10.1)


 


Total Bilirubin


  0.7 MG/DL


(0.2-1.0)


 


Aspartate Amino Transf


(AST/SGOT) 38 U/L (15-37)


H


 


Alanine Aminotransferase


(ALT/SGPT) 7 U/L (12-78)


L


 


Alkaline Phosphatase


  76 U/L


()


 


Total Protein


  6.6 G/DL


(6.4-8.2)


 


Albumin


  2.6 G/DL


(3.4-5.0)  L


 


Globulin 4.0 g/dL  


 


Albumin/Globulin Ratio


  0.6 (1.0-2.7)


L











Current Medications








 Medications


  (Trade)  Dose


 Ordered  Sig/Mitra


 Route


 PRN Reason  Start Time


 Stop Time Status Last Admin


Dose Admin


 


 Acetaminophen


  (Tylenol)  650 mg  Q6H  PRN


 ORAL


 Mild Pain (Pain Scale 1-3)  6/29/18 08:30


 7/25/18 08:29   


 


 


 Ascorbic Acid


  (Vitamin C)  500 mg  DAILY


 ORAL


   6/29/18 09:00


 7/25/18 08:59  6/30/18 10:10


 


 


 Aspirin


  (ASA)  81 mg  DAILY


 ORAL


   6/29/18 09:00


 7/26/18 08:59  6/30/18 10:08


 


 


 Atorvastatin


 Calcium


  (Lipitor)  10 mg  BEDTIME


 ORAL


   6/29/18 21:00


 7/27/18 20:59  6/29/18 22:40


 


 


 Bisacodyl


  (Dulcolax)  10 mg  DAILYPRN  PRN


 RECTAL


 Constipation  6/29/18 09:00


 7/25/18 08:59   


 


 


 Calcium Carbonate


  (Os-Osmin)  1,250 mg  BIDPC


 ORAL


   6/29/18 09:00


 7/25/18 08:59  6/30/18 10:08


 


 


 Carbidopa/Levodopa


  (Sinemet 25/100)  1 tab  Q4HR


 ORAL


   6/29/18 09:00


 7/25/18 08:59  6/30/18 10:18


 


 


 Carvedilol


  (Coreg)  6.25 mg  EVERY 12  HOURS


 ORAL


   6/29/18 09:00


 7/27/18 08:59  6/30/18 10:11


 


 


 Clonidine HCl


  (Catapres Tab)  0.1 mg  Q4H  PRN


 ORAL


 SBP greater than 160  6/29/18 09:00


 7/25/18 08:59   


 


 


 Dextrose


  (Dextrose 50%)  25 ml  STAT  PRN


 IV


 Hypoglycemia  6/29/18 09:00


 7/25/18 08:59   


 


 


 Dextrose


  (Dextrose 50%)  50 ml  STAT  PRN


 IV


 Hypoglycemia  6/29/18 09:00


 7/25/18 08:59  6/29/18 17:58


 


 


 Digoxin


  (Lanoxin)  0.125 mg  DAILY


 ORAL


   6/29/18 09:00


 7/25/18 08:59  6/30/18 10:09


 


 


 Docusate Sodium


  (Colace)  100 mg  TWICE A  DAY


 ORAL


   6/29/18 09:00


 7/25/18 08:59  6/30/18 10:11


 


 


 Dorzolamide HCl


  (Trusopt)  1 drop  TWICE A  DAY


 RIGHT EYE


   6/29/18 09:00


 7/25/18 09:59  6/30/18 10:18


 


 


 Ferrous Sulfate


  (Feosol)  325 mg  THREE TIMES A  DAY


 ORAL


   6/29/18 09:00


 7/25/18 08:59  6/30/18 10:10


 


 


 Finasteride


  (Proscar)  5 mg  DAILY


 ORAL


   6/29/18 09:00


 7/25/18 08:59  6/30/18 10:11


 


 


 Folic Acid


  (Folate)  1 mg  DAILY


 ORAL


   6/29/18 09:00


 7/25/18 08:59  6/30/18 10:08


 


 


 Furosemide


  (Lasix)  40 mg  DAILY


 ORAL


   6/30/18 09:00


 7/30/18 08:59  6/30/18 10:10


 


 


 Insulin Aspart


  (NovoLOG)    BEFORE MEALS AND  HS


 SUBQ


   6/29/18 11:30


 7/25/18 11:29  6/29/18 11:49


 


 


 Insulin Detemir


  (Levemir)  8 units  BID


 SUBQ


   6/29/18 09:00


 7/25/18 08:59  6/30/18 10:19


 


 


 Latanoprost


  (Xalatan)  1 drop  BEDTIME


 BOTH EYES


   6/29/18 21:00


 7/25/18 20:59   


 


 


 Magnesium


 Hydroxide


  (Mom)  30 ml  DAILY


 ORAL


   6/29/18 09:00


 7/25/18 08:59  6/30/18 10:11


 


 


 Memantine


  (Namenda)  10 mg  TWICE A  DAY


 ORAL


   6/29/18 09:00


 7/25/18 08:59  6/30/18 10:10


 


 


 Multivitamins


  (Multivitamins)  1 tab  DAILY


 ORAL


   6/29/18 09:00


 7/25/18 08:59  6/30/18 10:10


 


 


 Pantoprazole


  (Protonix)  40 mg  DAILY


 IVP


   6/29/18 14:00


 7/29/18 13:59  6/30/18 10:11


 


 


 Piperacillin Sod/


 Tazobactam Sod


 3.375 gm/Dextrose  110 ml @ 


 27.5 mls/hr  EVERY 8  HOURS


 IVPB


   6/29/18 14:00


 6/30/18 13:59  6/30/18 06:27


 


 


 Potassium Chloride


  (K-Dur)  20 meq  DAILY


 ORAL


   6/30/18 09:00


 7/30/18 08:59  6/30/18 10:11


 


 


 Sennosides


  (Senokot)  1 tab  BEDTIME


 ORAL


   6/29/18 21:00


 7/25/18 20:59   


 


 


 Tamsulosin HCl


  (Flomax)  0.4 mg  BEDTIME


 ORAL


   6/29/18 21:00


 7/25/18 20:59  6/29/18 22:38


 


 


 Vitamin B Complex


  (Vitamin B


 Complex)  1 tab  BEDTIME


 ORAL


   6/29/18 21:00


 7/25/18 20:59  6/29/18 22:39


 


 


 Vitamin D


  (Vitamin D)  1,000 intlu  DAILY


 ORAL


   6/29/18 09:00


 7/25/18 08:59  6/30/18 10:09


 


 


 Warfarin Sodium


  (Coumadin per


 pharmacy)  1 ea  DAILY  PRN


 MISC


 Per rx protocol  6/29/18 09:00


 7/25/18 08:29   


 

















NANCI PATE Jun 30, 2018 11:32

## 2018-06-30 NOTE — GENERAL PROGRESS NOTE
Assessment/Plan


Problem List:  


(1) Anemia


ICD Codes:  D64.9 - Anemia, unspecified


SNOMED:  981490655


Qualifiers:  


   Qualified Codes:  D64.9 - Anemia, unspecified


(2) CKD (chronic kidney disease)


ICD Codes:  N18.9 - Chronic kidney disease, unspecified


SNOMED:  911747925


Qualifiers:  


   Qualified Codes:  N18.9 - Chronic kidney disease, unspecified


(3) Toxic metabolic encephalopathy


ICD Codes:  G92 - Toxic encephalopathy


SNOMED:  393538582


(4) CHF (congestive heart failure)


ICD Codes:  I50.9 - Heart failure, unspecified


SNOMED:  26866933


Qualifiers:  


   Qualified Codes:  I50.9 - Heart failure, unspecified


(5) UTI (urinary tract infection)


ICD Codes:  N39.0 - Urinary tract infection, site not specified


SNOMED:  82646786


Qualifiers:  


   Qualified Codes:  N30.00 - Acute cystitis without hematuria


Assessment/Plan


iv abx


swallow eval/therapy


no gt/ngt per conservator


follow up cultures


dc ivf


lasix po


check cxr


replace lytes


monitor fluid status





Subjective


ROS Limited/Unobtainable:  No


Constitutional:  Reports: malaise, weakness


HEENT:  Reports: no symptoms


Cardiovascular:  Reports: no symptoms


Respiratory:  Reports: cough


Gastrointestinal/Abdominal:  Reports: no symptoms


Genitourinary:  Reports: no symptoms


Neurologic/Psychiatric:  Reports: pre-existing deficit


Endocrine:  Reports: no symptoms


Hematologic/Lymphatic:  Reports: anemia


Allergies:  


Coded Allergies:  


     APIXABAN (Unverified  Allergy, Unknown, 6/25/18)


     BUMETANIDE (Unverified  Allergy, Unknown, 6/25/18)


     SPIRONOLACTONE (Unverified  Allergy, Unknown, 6/25/18)


All Systems:  reviewed and negative except above


Subjective


no events. w/o complaints. off bipap. feels better. labs reviewed. cards/id 

noted. 


difficulty swallowing. not hungry. off ivf. on po lasix





Objective





Last 24 Hour Vital Signs








  Date Time  Temp Pulse Resp B/P (MAP) Pulse Ox O2 Delivery O2 Flow Rate FiO2


 


6/30/18 16:15  80 18  98 Nasal Cannula 2.0 28


 


6/30/18 16:00  76 18  94 Nasal Cannula 2.0 28


 


6/30/18 16:00 97.0 75 20 102/57 95   





 97.0       


 


6/30/18 15:24  75      


 


6/30/18 14:15  77 22   Venturi Mask 8.0 40


 


6/30/18 12:17  76      


 


6/30/18 12:00 97.0 75 20 98/58 96   





 97.0       


 


6/30/18 10:11  82  102/67    


 


6/30/18 10:09  82      


 


6/30/18 08:20  82 20  96 Nasal Cannula 2.0 28


 


6/30/18 08:05  75      


 


6/30/18 08:00 97.5 78 20 102/67 97   





 97.5       


 


6/30/18 07:59  76 16  92 Nasal Cannula 2.0 28


 


6/30/18 07:59     92 Nasal Cannula 2.0 28


 


6/30/18 07:59      Nasal Cannula 2.0 28


 


6/30/18 04:00 97.5 77 24 88/57 100   





 97.5       


 


6/30/18 04:00  75      


 


6/30/18 03:32  74 20  96 Nasal Cannula 2.0 28


 


6/30/18 03:32  71 16  95 Nasal Cannula 2.0 28


 


6/30/18 00:00  75      


 


6/30/18 00:00 97.3 75 22  98   





 97.3       


 


6/30/18 00:00  84 20  97 Nasal Cannula 2.0 28


 


6/30/18 00:00  74 16  96 Nasal Cannula 2.0 28


 


6/29/18 22:48  73  120/60    


 


6/29/18 22:46  73  120/60    


 


6/29/18 20:00  80 20  97 Nasal Cannula 2.0 28


 


6/29/18 20:00  78 18  94 Nasal Cannula 2.0 28


 


6/29/18 20:00  80      


 


6/29/18 20:00  85  98/60 99   


 


6/29/18 20:00     94 Nasal Cannula 2.0 28


 


6/29/18 20:00      Nasal Cannula 2.0 28

















Intake and Output  


 


 6/29/18 6/30/18





 19:00 07:00


 


Intake Total 360 ml 


 


Output Total 300 ml 900 ml


 


Balance 60 ml -900 ml


 


  


 


Intake Oral 360 ml 


 


Output Urine Total 300 ml 900 ml


 


# Voids 1 


 


# Bowel Movements 3 3








Laboratory Tests


6/30/18 08:25: 


Prothrombin Time 19.1H, Prothromb Time International Ratio 1.8H, Sodium Level 

133L, Potassium Level 3.7, Chloride Level 96L, Carbon Dioxide Level 29, Anion 

Gap 8, Blood Urea Nitrogen 24H, Creatinine 1.4H, Estimat Glomerular Filtration 

Rate , Glucose Level 96, Calcium Level 9.3, Total Bilirubin 0.7, Aspartate 

Amino Transf (AST/SGOT) 38H, Alanine Aminotransferase (ALT/SGPT) 7L, Alkaline 

Phosphatase 76, Total Protein 6.6, Albumin 2.6L, Globulin 4.0, Albumin/Globulin 

Ratio 0.6L


Height (Feet):  6


Height (Inches):  0.00


Weight (Pounds):  178


Objective


General Appearance:  WD/WN, alert


Neck:  supple


Cardiovascular:  regular rhythm


Respiratory/Chest:  chest wall non-tender, lungs clear, normal breath sounds, 

no respiratory distress


Abdomen:  normal bowel sounds, non tender, soft, no organomegaly


Edema:  no edema noted Arm (L), no edema noted Arm (R), no edema noted Leg (L), 

no edema noted Leg (R), no edema noted Pedal (L), no edema noted Pedal (R), no 

edema noted Generalized











Francisco Gomes MD Jun 30, 2018 18:46

## 2018-06-30 NOTE — PROGRESS NOTE
DATE:  06/29/2018



CARDIOLOGY PROGRESS NOTE



SUBJECTIVE:  The patient has no new complaints.  He is feeling better and

remains off BiPAP support.  Appetite remains poor.



OBJECTIVE:

VITAL SIGNS:  Blood pressure 107/57, pulse 80, respiratory rate 24.

Monitored rhythm, AFib, V-paced.

LUNGS:  Diminished breath sounds.  Few rales.

HEART:  Regular rhythm and rate.  Normal S1.  Paradoxically split

S2.

ABDOMEN:  Soft.

EXTREMITIES:  No edema.



LABORATORY AND DIAGNOSTIC DATA:  Chest x-ray today revealed increased

atelectasis at the right base.



Sodium 134, potassium 3.3, bicarbonate 30, BUN 30, and creatinine 1.3.

Magnesium 2.1.  Pro-natriuretic peptide increased to 11,000.  Albumin

2.8.



IMPRESSION:

1. Acute on chronic systolic and diastolic congestive heart failure.

2. Acute on chronic kidney failure, improved.

3. Hypokalemia.

4. Cardiac defibrillator.

5. Anemia of chronic kidney disease.

6. Paroxysmal atrial fibrillation.

7. Dysphagia with adequate swallow function.

8. Probable urinary tract infection with BPH and urinary retention.



PLAN:

1. Antimicrobials.

2. Off IV fluid.

3. Continue digoxin and beta-blocker.

4. Periodic diuresis based on clinical parameters.

5. Warfarin to INR goal of 2 to 3.









  ______________________________________________

  Henok Palma M.D.





DR:  Rama

D:  06/30/2018 00:04

T:  06/30/2018 00:46

JOB#:  [id number]

CC:

## 2018-07-01 VITALS — SYSTOLIC BLOOD PRESSURE: 91 MMHG | DIASTOLIC BLOOD PRESSURE: 50 MMHG

## 2018-07-01 VITALS — SYSTOLIC BLOOD PRESSURE: 125 MMHG | DIASTOLIC BLOOD PRESSURE: 74 MMHG

## 2018-07-01 VITALS — DIASTOLIC BLOOD PRESSURE: 56 MMHG | SYSTOLIC BLOOD PRESSURE: 101 MMHG

## 2018-07-01 VITALS — DIASTOLIC BLOOD PRESSURE: 58 MMHG | SYSTOLIC BLOOD PRESSURE: 99 MMHG

## 2018-07-01 VITALS — SYSTOLIC BLOOD PRESSURE: 98 MMHG | DIASTOLIC BLOOD PRESSURE: 61 MMHG

## 2018-07-01 VITALS — DIASTOLIC BLOOD PRESSURE: 54 MMHG | SYSTOLIC BLOOD PRESSURE: 92 MMHG

## 2018-07-01 LAB
ALBUMIN SERPL-MCNC: 2.6 G/DL (ref 3.4–5)
ALBUMIN/GLOB SERPL: 0.6 {RATIO} (ref 1–2.7)
ALP SERPL-CCNC: 77 U/L (ref 46–116)
ALT SERPL-CCNC: 9 U/L (ref 12–78)
ANION GAP SERPL CALC-SCNC: 6 MMOL/L (ref 5–15)
AST SERPL-CCNC: 41 U/L (ref 15–37)
BILIRUB SERPL-MCNC: 0.5 MG/DL (ref 0.2–1)
BUN SERPL-MCNC: 27 MG/DL (ref 7–18)
CALCIUM SERPL-MCNC: 9.6 MG/DL (ref 8.5–10.1)
CHLORIDE SERPL-SCNC: 99 MMOL/L (ref 98–107)
CO2 SERPL-SCNC: 27 MMOL/L (ref 21–32)
CREAT SERPL-MCNC: 1.4 MG/DL (ref 0.55–1.3)
GLOBULIN SER-MCNC: 4.1 G/DL
INR PPP: 2 (ref 0.9–1.1)
POTASSIUM SERPL-SCNC: 4.1 MMOL/L (ref 3.5–5.1)
SODIUM SERPL-SCNC: 132 MMOL/L (ref 136–145)

## 2018-07-01 RX ADMIN — IPRATROPIUM BROMIDE AND ALBUTEROL SULFATE SCH ML: .5; 3 SOLUTION RESPIRATORY (INHALATION) at 10:35

## 2018-07-01 RX ADMIN — MAGNESIUM HYDROXIDE SCH ML: 400 SUSPENSION ORAL at 10:07

## 2018-07-01 RX ADMIN — PANTOPRAZOLE SODIUM SCH MG: 40 INJECTION, POWDER, FOR SOLUTION INTRAVENOUS at 10:07

## 2018-07-01 RX ADMIN — INSULIN ASPART SCH UNITS: 100 INJECTION, SOLUTION INTRAVENOUS; SUBCUTANEOUS at 06:26

## 2018-07-01 RX ADMIN — CARBIDOPA AND LEVODOPA SCH TAB: 25; 100 TABLET ORAL at 10:18

## 2018-07-01 RX ADMIN — IPRATROPIUM BROMIDE AND ALBUTEROL SULFATE SCH ML: .5; 3 SOLUTION RESPIRATORY (INHALATION) at 19:51

## 2018-07-01 RX ADMIN — IPRATROPIUM BROMIDE AND ALBUTEROL SULFATE SCH ML: .5; 3 SOLUTION RESPIRATORY (INHALATION) at 03:31

## 2018-07-01 RX ADMIN — CARBIDOPA AND LEVODOPA SCH TAB: 25; 100 TABLET ORAL at 21:53

## 2018-07-01 RX ADMIN — IPRATROPIUM BROMIDE AND ALBUTEROL SULFATE SCH ML: .5; 3 SOLUTION RESPIRATORY (INHALATION) at 23:13

## 2018-07-01 RX ADMIN — FUROSEMIDE SCH MG: 40 TABLET ORAL at 10:06

## 2018-07-01 RX ADMIN — DOCUSATE SODIUM SCH MG: 100 CAPSULE, LIQUID FILLED ORAL at 10:06

## 2018-07-01 RX ADMIN — INSULIN ASPART SCH UNITS: 100 INJECTION, SOLUTION INTRAVENOUS; SUBCUTANEOUS at 21:55

## 2018-07-01 RX ADMIN — CARBIDOPA AND LEVODOPA SCH TAB: 25; 100 TABLET ORAL at 06:25

## 2018-07-01 RX ADMIN — IPRATROPIUM BROMIDE AND ALBUTEROL SULFATE SCH ML: .5; 3 SOLUTION RESPIRATORY (INHALATION) at 15:00

## 2018-07-01 RX ADMIN — CARVEDILOL SCH MG: 6.25 TABLET, FILM COATED ORAL at 09:00

## 2018-07-01 RX ADMIN — IPRATROPIUM BROMIDE AND ALBUTEROL SULFATE SCH ML: .5; 3 SOLUTION RESPIRATORY (INHALATION) at 06:51

## 2018-07-01 RX ADMIN — LATANOPROST SCH DROP: 50 SOLUTION OPHTHALMIC at 21:00

## 2018-07-01 RX ADMIN — ASPIRIN 81 MG SCH MG: 81 TABLET ORAL at 10:06

## 2018-07-01 RX ADMIN — VITAMIN D, TAB 1000IU (100/BT) SCH INTLU: 25 TAB at 10:06

## 2018-07-01 RX ADMIN — Medication SCH TAB: at 21:00

## 2018-07-01 RX ADMIN — MEMANTINE HYDROCHLORIDE SCH MG: 10 TABLET ORAL at 17:03

## 2018-07-01 RX ADMIN — CARBIDOPA AND LEVODOPA SCH TAB: 25; 100 TABLET ORAL at 13:59

## 2018-07-01 RX ADMIN — INSULIN ASPART SCH UNITS: 100 INJECTION, SOLUTION INTRAVENOUS; SUBCUTANEOUS at 16:47

## 2018-07-01 RX ADMIN — INSULIN DETEMIR SCH UNITS: 100 INJECTION, SOLUTION SUBCUTANEOUS at 18:04

## 2018-07-01 RX ADMIN — DOCUSATE SODIUM SCH MG: 100 CAPSULE, LIQUID FILLED ORAL at 17:03

## 2018-07-01 RX ADMIN — Medication SCH TAB: at 21:53

## 2018-07-01 RX ADMIN — CARBIDOPA AND LEVODOPA SCH TAB: 25; 100 TABLET ORAL at 17:09

## 2018-07-01 RX ADMIN — DIGOXIN SCH MG: 0.12 TABLET ORAL at 10:06

## 2018-07-01 RX ADMIN — DORZOLAMIDE HYDROCHLORIDE SCH DROP: 20 SOLUTION/ DROPS OPHTHALMIC at 17:04

## 2018-07-01 RX ADMIN — DORZOLAMIDE HYDROCHLORIDE SCH DROP: 20 SOLUTION/ DROPS OPHTHALMIC at 10:18

## 2018-07-01 RX ADMIN — INSULIN DETEMIR SCH UNITS: 100 INJECTION, SOLUTION SUBCUTANEOUS at 10:20

## 2018-07-01 RX ADMIN — Medication SCH MG: at 10:07

## 2018-07-01 RX ADMIN — INSULIN ASPART SCH UNITS: 100 INJECTION, SOLUTION INTRAVENOUS; SUBCUTANEOUS at 11:30

## 2018-07-01 RX ADMIN — MEMANTINE HYDROCHLORIDE SCH MG: 10 TABLET ORAL at 10:07

## 2018-07-01 RX ADMIN — CARBIDOPA AND LEVODOPA SCH TAB: 25; 100 TABLET ORAL at 01:00

## 2018-07-01 RX ADMIN — CARVEDILOL SCH MG: 6.25 TABLET, FILM COATED ORAL at 21:00

## 2018-07-01 RX ADMIN — TAMSULOSIN HYDROCHLORIDE SCH MG: 0.4 CAPSULE ORAL at 21:53

## 2018-07-01 NOTE — PROGRESS NOTE
DATE:  06/30/2018



CARDIOLOGY PROGRESS NOTE



SUBJECTIVE:  The patient is without complaints.  He is off BiPAP.  No

respiratory distress noted.  He is on maintenance dose diuretics at this

time.



OBJECTIVE:

VITAL SIGNS:  Blood pressure 195/51, pulse 75, respiratory rate 19, earlier

blood pressure was 102/57.

LUNGS:  Good breath sounds.

HEART:  Regular rhythm rate.  Normal S1, S2.  Monitored rhythm, atrial fib,

V-paced.

ABDOMEN:  Soft.  No edema.



LABORATORY AND DIAGNOSTIC DATA:  Sodium 133, bicarbonate 29, potassium 3.7,

BUN 24, creatinine 1.4.  Albumin 2.6.  Pro-natriuretic peptide yesterday

had increased to 11,000.  INR is 1.8.



IMPRESSION:

1. Low range blood pressure.

2. Cardiomyopathy.

3. Acute on chronic systolic and diastolic congestive heart failure.

4. Paroxysmal atrial fibrillation.

5. Slightly subtherapeutic INR.



PLAN:

1. Off antibiotics.

2. Monitor volume status.

3. May need fluid challenge.

4. Re-culture for temperature spike.

5. Titrate warfarin to INR of 2 to 3.

6. Hold diuretics for low range of blood pressure.









  ______________________________________________

  Henok Palma M.D. DR:  TONNY

D:  07/01/2018 02:03

T:  07/01/2018 02:37

JOB#:  [id number]

CC:

## 2018-07-01 NOTE — GENERAL PROGRESS NOTE
Assessment/Plan


Problem List:  


(1) Anemia


ICD Codes:  D64.9 - Anemia, unspecified


SNOMED:  229743088


Qualifiers:  


   Qualified Codes:  D64.9 - Anemia, unspecified


(2) CKD (chronic kidney disease)


ICD Codes:  N18.9 - Chronic kidney disease, unspecified


SNOMED:  339993389


Qualifiers:  


   Qualified Codes:  N18.9 - Chronic kidney disease, unspecified


(3) Toxic metabolic encephalopathy


ICD Codes:  G92 - Toxic encephalopathy


SNOMED:  842379992


(4) CHF (congestive heart failure)


ICD Codes:  I50.9 - Heart failure, unspecified


SNOMED:  72576218


Qualifiers:  


   Qualified Codes:  I50.9 - Heart failure, unspecified


(5) UTI (urinary tract infection)


ICD Codes:  N39.0 - Urinary tract infection, site not specified


SNOMED:  23163788


Qualifiers:  


   Qualified Codes:  N30.00 - Acute cystitis without hematuria


Status:  stable, progressing


Assessment/Plan


iv abx


swallow eval/therapy


no gt/ngt per conservator


follow up cultures


dc ivf


lasix po


check cxr


replace lytes


monitor fluid status





Subjective


ROS Limited/Unobtainable:  Yes


Constitutional:  Reports: malaise, weakness


HEENT:  Reports: no symptoms


Cardiovascular:  Reports: edema


Respiratory:  Reports: cough


Gastrointestinal/Abdominal:  Reports: difficulty swallowing


Genitourinary:  Reports: no symptoms


Neurologic/Psychiatric:  Reports: pre-existing deficit


Endocrine:  Reports: no symptoms


Hematologic/Lymphatic:  Reports: anemia


Allergies:  


Coded Allergies:  


     APIXABAN (Unverified  Allergy, Unknown, 6/25/18)


     BUMETANIDE (Unverified  Allergy, Unknown, 6/25/18)


     SPIRONOLACTONE (Unverified  Allergy, Unknown, 6/25/18)


All Systems:  reviewed and negative except above


Subjective


no events. w/o complaints. off bipap.labs reviewed. cards/id noted. 


difficulty swallowing. not hungry. off ivf. on po lasix





Objective





Last 24 Hour Vital Signs








  Date Time  Temp Pulse Resp B/P (MAP) Pulse Ox O2 Delivery O2 Flow Rate FiO2


 


7/1/18 10:42  72 16  100 Nasal Cannula 2.0 28


 


7/1/18 10:35  73 16  100 Nasal Cannula 2.0 28


 


7/1/18 10:06  77      


 


7/1/18 09:00  77  98/61    


 


7/1/18 06:58  77 16  100 Nasal Cannula 2.0 28 7/1/18 06:52  75 16  100 Nasal Cannula 2.0 28 7/1/18 06:51     100 Nasal Cannula 2.0 28 7/1/18 06:51      Nasal Cannula 2.0 28 7/1/18 04:00 98.2 74 19 91/50 97   





 98.2       


 


7/1/18 04:00  75      


 


7/1/18 03:41  76 16  99 Nasal Cannula 2.0 28 7/1/18 03:31  76 16  98 Nasal Cannula 2.0 28 7/1/18 00:00  75      


 


7/1/18 00:00 97.3 75 18 125/74 98   





 97.3       


 


6/30/18 23:46  78 18  98 Nasal Cannula 2.0 28 6/30/18 23:36  78 16  97 Nasal Cannula 2.0 28 6/30/18 20:17  79 18  99 Nasal Cannula 2.0 28


 


6/30/18 20:07     98 Nasal Cannula 2.0 28


 


6/30/18 20:07      Nasal Cannula 2.0 28


 


6/30/18 20:07  73 16  98 Nasal Cannula 2.0 28


 


6/30/18 20:00 97.0 75 19 95/51 98   





 97.0       


 


6/30/18 20:00  75      


 


6/30/18 16:15  80 18  98 Nasal Cannula 2.0 28


 


6/30/18 16:00  76 18  94 Nasal Cannula 2.0 28


 


6/30/18 16:00 97.0 75 20 102/57 95   





 97.0       


 


6/30/18 15:24  75      


 


6/30/18 14:15  77 22   Venturi Mask 8.0 40


 


6/30/18 12:17  76      


 


6/30/18 12:00 97.0 75 20 98/58 96   





 97.0       

















Intake and Output  


 


 6/30/18 7/1/18





 19:00 07:00


 


Intake Total 240 ml 


 


Output Total 500 ml 600 ml


 


Balance -260 ml -600 ml


 


  


 


Intake Oral 240 ml 


 


Output Urine Total 500 ml 600 ml


 


# Bowel Movements  3








Laboratory Tests


7/1/18 07:00: 


Prothrombin Time 20.9H, Prothromb Time International Ratio 2.0H, Sodium Level 

132L, Potassium Level 4.1, Chloride Level 99, Carbon Dioxide Level 27, Anion 

Gap 6, Blood Urea Nitrogen 27H, Creatinine 1.4H, Estimat Glomerular Filtration 

Rate , Glucose Level 55L, Calcium Level 9.6, Total Bilirubin 0.5, Aspartate 

Amino Transf (AST/SGOT) 41H, Alanine Aminotransferase (ALT/SGPT) 9L, Alkaline 

Phosphatase 77, Total Protein 6.7, Albumin 2.6L, Globulin 4.1, Albumin/Globulin 

Ratio 0.6L


Height (Feet):  6


Height (Inches):  0.00


Weight (Pounds):  172


Objective


General Appearance:  WD/WN, alert


Neck:  supple


Cardiovascular:  regular rhythm


Respiratory/Chest:  chest wall non-tender, lungs clear, normal breath sounds, 

no respiratory distress


Abdomen:  normal bowel sounds, non tender, soft, no organomegaly


Edema:  no edema noted Arm (L), no edema noted Arm (R), no edema noted Leg (L), 

no edema noted Leg (R), no edema noted Pedal (L), no edema noted Pedal (R), no 

edema noted Generalized











Francisco Gomes MD Jul 1, 2018 10:49

## 2018-07-01 NOTE — INFECTIOUS DISEASES PROG NOTE
Assessment/Plan


Assessment/Plan


A


1. UTI treated


2. renal failure stable


3. COPD


4. CHF


5. diabetes


6. hypertension


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


8.MRSA colonization





P


1. Observe off antibiotic





Subjective


ROS Limited/Unobtainable:  Yes


Allergies:  


Coded Allergies:  


     APIXABAN (Unverified  Allergy, Unknown, 6/25/18)


     BUMETANIDE (Unverified  Allergy, Unknown, 6/25/18)


     SPIRONOLACTONE (Unverified  Allergy, Unknown, 6/25/18)





Objective


Vital Signs





Last 24 Hour Vital Signs








  Date Time  Temp Pulse Resp B/P (MAP) Pulse Ox O2 Delivery O2 Flow Rate FiO2


 


7/1/18 10:42  72 16  100 Nasal Cannula 2.0 28 7/1/18 10:35  73 16  100 Nasal Cannula 2.0 28


 


7/1/18 10:06  77      


 


7/1/18 09:00  77  98/61    


 


7/1/18 06:58  77 16  100 Nasal Cannula 2.0 28


 


7/1/18 06:52  75 16  100 Nasal Cannula 2.0 28


 


7/1/18 06:51     100 Nasal Cannula 2.0 28


 


7/1/18 06:51      Nasal Cannula 2.0 28


 


7/1/18 04:00 98.2 74 19 91/50 97   





 98.2       


 


7/1/18 04:00  75      


 


7/1/18 03:41  76 16  99 Nasal Cannula 2.0 28


 


7/1/18 03:31  76 16  98 Nasal Cannula 2.0 28


 


7/1/18 00:00  75      


 


7/1/18 00:00 97.3 75 18 125/74 98   





 97.3       


 


6/30/18 23:46  78 18  98 Nasal Cannula 2.0 28


 


6/30/18 23:36  78 16  97 Nasal Cannula 2.0 28


 


6/30/18 20:17  79 18  99 Nasal Cannula 2.0 28


 


6/30/18 20:07     98 Nasal Cannula 2.0 28


 


6/30/18 20:07      Nasal Cannula 2.0 28


 


6/30/18 20:07  73 16  98 Nasal Cannula 2.0 28


 


6/30/18 20:00 97.0 75 19 95/51 98   





 97.0       


 


6/30/18 20:00  75      


 


6/30/18 16:15  80 18  98 Nasal Cannula 2.0 28


 


6/30/18 16:00  76 18  94 Nasal Cannula 2.0 28


 


6/30/18 16:00 97.0 75 20 102/57 95   





 97.0       


 


6/30/18 15:24  75      


 


6/30/18 14:15  77 22   Venturi Mask 8.0 40


 


6/30/18 12:17  76      


 


6/30/18 12:00 97.0 75 20 98/58 96   





 97.0       








Height (Feet):  6


Height (Inches):  0.00


Weight (Pounds):  172


HEENT:  other - dry mouth


Cardiovascular:  normal rate


Abdomen:  soft, non tender


Extremities:  no edema


Neurologic/Psychiatric:  aphasia, other - opens eyes





Laboratory Tests








Test


  7/1/18


07:00


 


Prothrombin Time


  20.9 SEC


(9.30-11.50)  H


 


Prothromb Time International


Ratio 2.0 (0.9-1.1)


H


 


Sodium Level


  132 MMOL/L


(136-145)  L


 


Potassium Level


  4.1 MMOL/L


(3.5-5.1)


 


Chloride Level


  99 MMOL/L


()


 


Carbon Dioxide Level


  27 MMOL/L


(21-32)


 


Anion Gap


  6 mmol/L


(5-15)


 


Blood Urea Nitrogen


  27 mg/dL


(7-18)  H


 


Creatinine


  1.4 MG/DL


(0.55-1.30)  H


 


Estimat Glomerular Filtration


Rate  mL/min (>60)  


 


 


Glucose Level


  55 MG/DL


()  L


 


Calcium Level


  9.6 MG/DL


(8.5-10.1)


 


Total Bilirubin


  0.5 MG/DL


(0.2-1.0)


 


Aspartate Amino Transf


(AST/SGOT) 41 U/L (15-37)


H


 


Alanine Aminotransferase


(ALT/SGPT) 9 U/L (12-78)


L


 


Alkaline Phosphatase


  77 U/L


()


 


Total Protein


  6.7 G/DL


(6.4-8.2)


 


Albumin


  2.6 G/DL


(3.4-5.0)  L


 


Globulin 4.1 g/dL  


 


Albumin/Globulin Ratio


  0.6 (1.0-2.7)


L











Current Medications








 Medications


  (Trade)  Dose


 Ordered  Sig/Mitra


 Route


 PRN Reason  Start Time


 Stop Time Status Last Admin


Dose Admin


 


 Acetaminophen


  (Tylenol)  650 mg  Q6H  PRN


 ORAL


 Mild Pain (Pain Scale 1-3)  6/29/18 08:30


 7/25/18 08:29   


 


 


 Albuterol/


 Ipratropium


  (Albuterol/


 Ipratropium)  3 ml  Q4HRT


 HHN


   6/30/18 15:00


 7/5/18 14:59  7/1/18 10:35


 


 


 Ascorbic Acid


  (Vitamin C)  500 mg  DAILY


 ORAL


   6/29/18 09:00


 7/25/18 08:59  7/1/18 10:07


 


 


 Aspirin


  (ASA)  81 mg  DAILY


 ORAL


   6/29/18 09:00


 7/26/18 08:59  7/1/18 10:06


 


 


 Atorvastatin


 Calcium


  (Lipitor)  10 mg  BEDTIME


 ORAL


   6/29/18 21:00


 7/27/18 20:59  6/30/18 21:46


 


 


 Bisacodyl


  (Dulcolax)  10 mg  DAILYPRN  PRN


 RECTAL


 Constipation  6/29/18 09:00


 7/25/18 08:59   


 


 


 Calcium Carbonate


  (Os-Osmin)  1,250 mg  BIDPC


 ORAL


   6/29/18 09:00


 7/25/18 08:59  7/1/18 10:06


 


 


 Carbidopa/Levodopa


  (Sinemet 25/100)  1 tab  Q4HR


 ORAL


   6/29/18 09:00


 7/25/18 08:59  7/1/18 10:18


 


 


 Carvedilol


  (Coreg)  6.25 mg  EVERY 12  HOURS


 ORAL


   6/29/18 09:00


 7/27/18 08:59  6/30/18 10:11


 


 


 Clonidine HCl


  (Catapres Tab)  0.1 mg  Q4H  PRN


 ORAL


 SBP greater than 160  6/29/18 09:00


 7/25/18 08:59   


 


 


 Dextrose


  (Dextrose 50%)  25 ml  STAT  PRN


 IV


 Hypoglycemia  6/29/18 09:00


 7/25/18 08:59   


 


 


 Dextrose


  (Dextrose 50%)  50 ml  STAT  PRN


 IV


 Hypoglycemia  6/29/18 09:00


 7/25/18 08:59  6/29/18 17:58


 


 


 Digoxin


  (Lanoxin)  0.125 mg  DAILY


 ORAL


   6/29/18 09:00


 7/25/18 08:59  7/1/18 10:06


 


 


 Docusate Sodium


  (Colace)  100 mg  TWICE A  DAY


 ORAL


   6/29/18 09:00


 7/25/18 08:59  7/1/18 10:06


 


 


 Dorzolamide HCl


  (Trusopt)  1 drop  TWICE A  DAY


 RIGHT EYE


   6/29/18 09:00


 7/25/18 09:59  7/1/18 10:18


 


 


 Ferrous Sulfate


  (Feosol)  325 mg  THREE TIMES A  DAY


 ORAL


   6/29/18 09:00


 7/25/18 08:59  7/1/18 10:07


 


 


 Finasteride


  (Proscar)  5 mg  DAILY


 ORAL


   6/29/18 09:00


 7/25/18 08:59  7/1/18 10:07


 


 


 Folic Acid


  (Folate)  1 mg  DAILY


 ORAL


   6/29/18 09:00


 7/25/18 08:59  7/1/18 10:06


 


 


 Furosemide


  (Lasix)  40 mg  DAILY


 ORAL


   6/30/18 09:00


 7/30/18 08:59  7/1/18 10:06


 


 


 Insulin Aspart


  (NovoLOG)    BEFORE MEALS AND  HS


 SUBQ


   6/29/18 11:30


 7/25/18 11:29  6/30/18 16:51


 


 


 Insulin Detemir


  (Levemir)  8 units  BID


 SUBQ


   6/29/18 09:00


 7/25/18 08:59  7/1/18 10:20


 


 


 Latanoprost


  (Xalatan)  1 drop  BEDTIME


 BOTH EYES


   6/29/18 21:00


 7/25/18 20:59  6/30/18 21:00


 


 


 Magnesium


 Hydroxide


  (Mom)  30 ml  DAILY


 ORAL


   6/29/18 09:00


 7/25/18 08:59  7/1/18 10:07


 


 


 Memantine


  (Namenda)  10 mg  TWICE A  DAY


 ORAL


   6/29/18 09:00


 7/25/18 08:59  7/1/18 10:07


 


 


 Multivitamins


  (Multivitamins)  1 tab  DAILY


 ORAL


   6/29/18 09:00


 7/25/18 08:59  7/1/18 10:07


 


 


 Pantoprazole


  (Protonix)  40 mg  DAILY


 IVP


   6/29/18 14:00


 7/29/18 13:59  7/1/18 10:07


 


 


 Potassium Chloride


  (K-Dur)  20 meq  DAILY


 ORAL


   6/30/18 09:00


 7/30/18 08:59  7/1/18 10:06


 


 


 Sennosides


  (Senokot)  1 tab  BEDTIME


 ORAL


   6/29/18 21:00


 7/25/18 20:59   


 


 


 Tamsulosin HCl


  (Flomax)  0.4 mg  BEDTIME


 ORAL


   6/29/18 21:00


 7/25/18 20:59  6/30/18 21:46


 


 


 Vitamin B Complex


  (Vitamin B


 Complex)  1 tab  BEDTIME


 ORAL


   6/29/18 21:00


 7/25/18 20:59  6/30/18 21:46


 


 


 Vitamin D


  (Vitamin D)  1,000 intlu  DAILY


 ORAL


   6/29/18 09:00


 7/25/18 08:59  7/1/18 10:06


 


 


 Warfarin Sodium


  (Coumadin per


 pharmacy)  1 ea  DAILY  PRN


 MISC


 Per rx protocol  6/29/18 09:00


 7/25/18 08:29   


 


 


 Warfarin Sodium


  (Coumadin)  1 mg  COUMADIN


 ORAL


   7/1/18 17:00


 7/1/18 18:00   


 

















Yahir Chapa MD Jul 1, 2018 11:58

## 2018-07-02 VITALS — DIASTOLIC BLOOD PRESSURE: 58 MMHG | SYSTOLIC BLOOD PRESSURE: 103 MMHG

## 2018-07-02 VITALS — SYSTOLIC BLOOD PRESSURE: 110 MMHG | DIASTOLIC BLOOD PRESSURE: 58 MMHG

## 2018-07-02 VITALS — DIASTOLIC BLOOD PRESSURE: 54 MMHG | SYSTOLIC BLOOD PRESSURE: 98 MMHG

## 2018-07-02 VITALS — SYSTOLIC BLOOD PRESSURE: 96 MMHG | DIASTOLIC BLOOD PRESSURE: 60 MMHG

## 2018-07-02 LAB — INR PPP: 1.6 (ref 0.9–1.1)

## 2018-07-02 RX ADMIN — INSULIN DETEMIR SCH UNITS: 100 INJECTION, SOLUTION SUBCUTANEOUS at 08:39

## 2018-07-02 RX ADMIN — CARBIDOPA AND LEVODOPA SCH TAB: 25; 100 TABLET ORAL at 08:34

## 2018-07-02 RX ADMIN — Medication SCH MG: at 08:34

## 2018-07-02 RX ADMIN — VITAMIN D, TAB 1000IU (100/BT) SCH INTLU: 25 TAB at 08:35

## 2018-07-02 RX ADMIN — INSULIN ASPART SCH UNITS: 100 INJECTION, SOLUTION INTRAVENOUS; SUBCUTANEOUS at 11:19

## 2018-07-02 RX ADMIN — IPRATROPIUM BROMIDE AND ALBUTEROL SULFATE SCH ML: .5; 3 SOLUTION RESPIRATORY (INHALATION) at 03:11

## 2018-07-02 RX ADMIN — FUROSEMIDE SCH MG: 40 TABLET ORAL at 08:35

## 2018-07-02 RX ADMIN — MEMANTINE HYDROCHLORIDE SCH MG: 10 TABLET ORAL at 08:33

## 2018-07-02 RX ADMIN — IPRATROPIUM BROMIDE AND ALBUTEROL SULFATE SCH ML: .5; 3 SOLUTION RESPIRATORY (INHALATION) at 10:46

## 2018-07-02 RX ADMIN — DIGOXIN SCH MG: 0.12 TABLET ORAL at 08:35

## 2018-07-02 RX ADMIN — MAGNESIUM HYDROXIDE SCH ML: 400 SUSPENSION ORAL at 08:33

## 2018-07-02 RX ADMIN — CARVEDILOL SCH MG: 6.25 TABLET, FILM COATED ORAL at 08:34

## 2018-07-02 RX ADMIN — DORZOLAMIDE HYDROCHLORIDE SCH DROP: 20 SOLUTION/ DROPS OPHTHALMIC at 08:36

## 2018-07-02 RX ADMIN — PANTOPRAZOLE SODIUM SCH MG: 40 INJECTION, POWDER, FOR SOLUTION INTRAVENOUS at 08:41

## 2018-07-02 RX ADMIN — CARBIDOPA AND LEVODOPA SCH TAB: 25; 100 TABLET ORAL at 14:04

## 2018-07-02 RX ADMIN — INSULIN ASPART SCH UNITS: 100 INJECTION, SOLUTION INTRAVENOUS; SUBCUTANEOUS at 06:01

## 2018-07-02 RX ADMIN — DOCUSATE SODIUM SCH MG: 100 CAPSULE, LIQUID FILLED ORAL at 08:36

## 2018-07-02 RX ADMIN — CARBIDOPA AND LEVODOPA SCH TAB: 25; 100 TABLET ORAL at 01:27

## 2018-07-02 RX ADMIN — IPRATROPIUM BROMIDE AND ALBUTEROL SULFATE SCH ML: .5; 3 SOLUTION RESPIRATORY (INHALATION) at 07:40

## 2018-07-02 RX ADMIN — ASPIRIN 81 MG SCH MG: 81 TABLET ORAL at 08:35

## 2018-07-02 RX ADMIN — CARBIDOPA AND LEVODOPA SCH TAB: 25; 100 TABLET ORAL at 05:18

## 2018-07-02 NOTE — DISCHARGE SUMMARY
DATE OF ADMISSION:  06/25/2018



DATE OF DISCHARGE:  07/02/2018



ADMISSION DIAGNOSES:

1. Respiratory failure.

2. Congestive heart failure exacerbation.

3. Urinary tract infection.

4. Possible pneumonia.

5. Chronic obstructive pulmonary disease.

6. Acute on chronic toxic metabolic encephalopathy.

7. Atrial fibrillation.



DISCHARGE DIAGNOSES:

1. Respiratory failure.

2. Congestive heart failure exacerbation.

3. Urinary tract infection.

4. Possible pneumonia.

5. Chronic obstructive pulmonary disease.

6. Acute on chronic toxic metabolic encephalopathy.

7. Atrial fibrillation.



HOSPITAL COURSE:  The patient is an 87-year-old male admitted with

complaints of shortness of breath and respiratory failure.  Initially, he

was placed on BiPAP.  He received IV antibiotics.  He was weaned off

BiPAP.  He was aggressively diuresed.  ID consultation obtained and

adjusted the patient's antibiotics.  On discharge, the patient was

improved to be discharged back to skilled nursing facility.  He will keep

his Braxton catheter.  As renal function did improve, we will place him on

the catheter.  He will be continued on Coumadin.



DISCHARGE MEDICATIONS:  Please see discharge medication list for discharge

medications.



DIET:  Pureed diet.



ACTIVITIES:  Ad-jazmine.



FOLLOWUP:  The patient will follow up in one to two days at the skilled

nursing facility.









  ______________________________________________

  Francisco Gomes M.D.





DR:  VALORIE

D:  07/02/2018 10:21

T:  07/02/2018 22:19

JOB#:  [id number]

CC:

## 2018-07-02 NOTE — PROGRESS NOTE
DATE:  07/02/2018



CARDIOLOGY PROGRESS NOTE



SUBJECTIVE:  The patient is comfortable.  No distress.  Off BiPAP.

Monitor, atrial fibrillation with demand pacing.



OBJECTIVE:

VITAL SIGNS:  Blood pressure 110/58, pulse 74, and respirations 18.

LUNGS:  Good breath sounds.  No wheezing.  No rales.

HEART:  Regular rhythm and rate.  Normal S1, paradoxically split S2.  A 1/6

systolic apical murmur.

ABDOMEN:  Soft.

EXTREMITIES:  Trace edema.



IMPRESSION:  This is a gentleman with severe cardiomyopathy and acute on

chronic systolic and diastolic congestive heart failure, who is now

clinically compensated and is status post treatment for urinary tract

infection with recovered sepsis.  His acute on chronic renal failure has

recovered.  His paroxysmal atrial fibrillation is rate controlled and he

has not had any sustained ventricular ectopy.  His cardiac defibrillator

is functioning appropriately and will be interrogated as an outpatient on

an usual schedule.



PLAN:  Discharge medication regimen reviewed and the patient is stable for

transfer to a skilled nursing facility.  He will maintain warfarin for an

INR goal of 2 to 3 for cardioembolic prophylaxis.









  ______________________________________________

  Henok Palma M.D.





DR:  ANGEL

D:  07/02/2018 23:19

T:  07/02/2018 23:29

JOB#:  [id number]

CC:

## 2018-07-02 NOTE — PROGRESS NOTE
DATE:  07/01/2018



CARDIOLOGY PROGRESS NOTE



SUBJECTIVE:  The patient has no new complaints.  He is off BiPAP.  He has

no shortness of breath.



OBJECTIVE:

VITAL SIGNS:  Blood pressure 101/56, pulse 76, and respirations 20.

Monitored rhythm atrial fibrillation.  Benign pacing.

LUNGS:  Coarse breath sounds.  Scattered rhonchi.  No wheezing.

HEART:  Regular rhythm and rate.  Normal S1, paradoxically split

S2.

ABDOMEN:  Soft.

EXTREMITIES:  No edema.



LABORATORY DATA:  Sodium 132, potassium 4.1, bicarbonate 27, BUN 27, and

creatinine 1.4.  Albumin 2.6.  INR 2.0.



IMPRESSION:

1. Acute on chronic systolic and diastolic congestive heart failure, now

compensated.

2. Urinary tract infection with sepsis, now treated.

3. Acute on chronic renal failure, recovered.

4. Paroxysmal atrial fibrillation.

5. Cardiac defibrillator.

6. Nonsustained ventricular tachycardia.

7. Chronic obstructive pulmonary disease with no active bronchospasm.



PLAN:

1. Observe off antibiotics.

2. Continue maintenance diuretic.

3. INR goal of 2 to 3 with warfarin achieved.

4. Adjust therapy for low range blood pressure.

5. Discharge planning.









  ______________________________________________

  Henok Palma M.D. DR:  ANGEL

D:  07/01/2018 21:12

T:  07/02/2018 01:19

JOB#:  [id number]

CC:

## 2018-07-02 NOTE — CARDIOLOGY REPORT
--------------- APPROVED REPORT --------------





EXAM: Two-dimensional and M-mode echocardiogram with Doppler and color 

Doppler.



INDICATION

Congestive Heart Failure 



M-Mode DIMENSIONS 

IVSd1.6 (0.7-1.1cm)Left Atrium (MM)4.5 (1.6-4.0cm)

LVDd4.6 (3.5-5.6cm)Aortic Root4.0 (2.0-3.7cm)

PWd1.4 (0.7-1.1cm)Aortic Cusp Exc.1.8 (1.5-2.0cm)

IVSs1.8 cm

LVDs3.6 (2.5-4.0cm)

PWs1.6 cm





Technically difficult study due to poor acoustical windows.

Normal left ventricular systolic function is grossly normal.

Study quality precludes accurate  assessment of regional wall motion.

Mild left ventricular enlargements .

Left ventricular ejection fraction estimated to be 55-60  %.

Mild  left ventricular hypertrophy by 2-D.

No evidence of pericardial effusion.

Mild bi- atrial enlargement.

Right ventricular chamber sizes is within normal limits.

Focal aortic valve sclerosis with adequate cusp excursion.

Mitral annulus and aortic root calcification.

Pulmonic valve not well visualized.

Normal tricuspid valve structure. 

IVC at 1.7 cm without physiologic collapse.



A  color flow and spectral Doppler study was performed and revealed:

Mild aortic regurgitation.

Mild to moderate  mitral regurgitation.

LV diastolic function can not determine due to arrhythmia.

Mild tricuspid regurgitation.

Tricuspid  systolic velocities suggests peak right ventricular systolic pressure of 36 

mmHg ,consistent with mild pulmonary hypertension .